# Patient Record
Sex: MALE | Race: WHITE | Employment: FULL TIME | URBAN - METROPOLITAN AREA
[De-identification: names, ages, dates, MRNs, and addresses within clinical notes are randomized per-mention and may not be internally consistent; named-entity substitution may affect disease eponyms.]

---

## 2017-11-27 PROBLEM — I10 ESSENTIAL HYPERTENSION: Status: ACTIVE | Noted: 2017-11-27

## 2018-06-06 ENCOUNTER — APPOINTMENT (OUTPATIENT)
Dept: PHYSICAL THERAPY | Age: 38
End: 2018-06-06

## 2019-08-05 ENCOUNTER — HOSPITAL ENCOUNTER (OUTPATIENT)
Dept: SLEEP MEDICINE | Age: 39
Discharge: HOME | End: 2019-08-05
Attending: FAMILY MEDICINE
Payer: COMMERCIAL

## 2019-08-05 PROCEDURE — 95811 POLYSOM 6/>YRS CPAP 4/> PARM: CPT

## 2021-09-28 ENCOUNTER — HOSPITAL ENCOUNTER (INPATIENT)
Age: 41
LOS: 7 days | Discharge: HOME HEALTH | DRG: 177 | End: 2021-10-05
Attending: EMERGENCY MEDICINE | Admitting: FAMILY MEDICINE
Payer: COMMERCIAL

## 2021-09-28 ENCOUNTER — APPOINTMENT (OUTPATIENT)
Dept: GENERAL RADIOLOGY | Age: 41
DRG: 177 | End: 2021-09-28
Attending: EMERGENCY MEDICINE
Payer: COMMERCIAL

## 2021-09-28 DIAGNOSIS — J96.01 ACUTE RESPIRATORY FAILURE WITH HYPOXIA (HCC): ICD-10-CM

## 2021-09-28 DIAGNOSIS — U07.1 COVID-19 VIRUS INFECTION: Primary | ICD-10-CM

## 2021-09-28 PROBLEM — G47.33 OSA (OBSTRUCTIVE SLEEP APNEA): Status: ACTIVE | Noted: 2021-09-28

## 2021-09-28 PROBLEM — J96.00 ACUTE RESPIRATORY FAILURE (HCC): Status: ACTIVE | Noted: 2021-09-28

## 2021-09-28 LAB
25(OH)D3 SERPL-MCNC: 50.4 NG/ML (ref 30–100)
ALBUMIN SERPL-MCNC: 2.4 G/DL (ref 3.5–5)
ALBUMIN/GLOB SERPL: 0.5 {RATIO} (ref 1.2–3.5)
ALP SERPL-CCNC: 57 U/L (ref 50–136)
ALT SERPL-CCNC: 85 U/L (ref 12–65)
ANION GAP SERPL CALC-SCNC: 8 MMOL/L (ref 7–16)
AST SERPL-CCNC: 68 U/L (ref 15–37)
ATRIAL RATE: 70 BPM
BASOPHILS # BLD: 0.1 K/UL (ref 0–0.2)
BASOPHILS NFR BLD: 1 % (ref 0–2)
BILIRUB SERPL-MCNC: 1 MG/DL (ref 0.2–1.1)
BNP SERPL-MCNC: 197 PG/ML (ref 5–125)
BUN SERPL-MCNC: 17 MG/DL (ref 6–23)
CALCIUM SERPL-MCNC: 8.7 MG/DL (ref 8.3–10.4)
CALCULATED P AXIS, ECG09: 44 DEGREES
CALCULATED R AXIS, ECG10: -13 DEGREES
CHLORIDE SERPL-SCNC: 105 MMOL/L (ref 98–107)
CO2 SERPL-SCNC: 22 MMOL/L (ref 21–32)
COVID-19 RAPID TEST, COVR: DETECTED
CREAT SERPL-MCNC: 0.74 MG/DL (ref 0.8–1.5)
CRP SERPL-MCNC: 10.7 MG/DL (ref 0–0.9)
DIAGNOSIS, 93000: NORMAL
DIFFERENTIAL METHOD BLD: ABNORMAL
EOSINOPHIL # BLD: 0.1 K/UL (ref 0–0.8)
EOSINOPHIL NFR BLD: 1 % (ref 0.5–7.8)
ERYTHROCYTE [DISTWIDTH] IN BLOOD BY AUTOMATED COUNT: 11.9 % (ref 11.9–14.6)
GLOBULIN SER CALC-MCNC: 4.8 G/DL (ref 2.3–3.5)
GLUCOSE SERPL-MCNC: 111 MG/DL (ref 65–100)
HCT VFR BLD AUTO: 39.1 % (ref 41.1–50.3)
HGB BLD-MCNC: 13.8 G/DL (ref 13.6–17.2)
IMM GRANULOCYTES # BLD AUTO: 0.2 K/UL (ref 0–0.5)
IMM GRANULOCYTES NFR BLD AUTO: 4 % (ref 0–5)
LYMPHOCYTES # BLD: 0.6 K/UL (ref 0.5–4.6)
LYMPHOCYTES NFR BLD: 10 % (ref 13–44)
MAGNESIUM SERPL-MCNC: 1.9 MG/DL (ref 1.8–2.4)
MCH RBC QN AUTO: 31.9 PG (ref 26.1–32.9)
MCHC RBC AUTO-ENTMCNC: 35.3 G/DL (ref 31.4–35)
MCV RBC AUTO: 90.5 FL (ref 79.6–97.8)
MONOCYTES # BLD: 0.3 K/UL (ref 0.1–1.3)
MONOCYTES NFR BLD: 5 % (ref 4–12)
NEUTS SEG # BLD: 4.8 K/UL (ref 1.7–8.2)
NEUTS SEG NFR BLD: 79 % (ref 43–78)
NRBC # BLD: 0 K/UL (ref 0–0.2)
P-R INTERVAL, ECG05: 176 MS
PLATELET # BLD AUTO: 188 K/UL (ref 150–450)
PMV BLD AUTO: 10.5 FL (ref 9.4–12.3)
POTASSIUM SERPL-SCNC: 4.5 MMOL/L (ref 3.5–5.1)
PROCALCITONIN SERPL-MCNC: 0.09 NG/ML
PROT SERPL-MCNC: 7.2 G/DL (ref 6.3–8.2)
Q-T INTERVAL, ECG07: 400 MS
QRS DURATION, ECG06: 92 MS
QTC CALCULATION (BEZET), ECG08: 432 MS
RBC # BLD AUTO: 4.32 M/UL (ref 4.23–5.6)
SODIUM SERPL-SCNC: 135 MMOL/L (ref 136–145)
SOURCE, COVRS: ABNORMAL
VENTRICULAR RATE, ECG03: 70 BPM
WBC # BLD AUTO: 6.1 K/UL (ref 4.3–11.1)

## 2021-09-28 PROCEDURE — 71045 X-RAY EXAM CHEST 1 VIEW: CPT

## 2021-09-28 PROCEDURE — 82306 VITAMIN D 25 HYDROXY: CPT

## 2021-09-28 PROCEDURE — 93005 ELECTROCARDIOGRAM TRACING: CPT | Performed by: EMERGENCY MEDICINE

## 2021-09-28 PROCEDURE — 65270000029 HC RM PRIVATE

## 2021-09-28 PROCEDURE — 74011250636 HC RX REV CODE- 250/636: Performed by: EMERGENCY MEDICINE

## 2021-09-28 PROCEDURE — 87635 SARS-COV-2 COVID-19 AMP PRB: CPT

## 2021-09-28 PROCEDURE — 36415 COLL VENOUS BLD VENIPUNCTURE: CPT

## 2021-09-28 PROCEDURE — 74011250637 HC RX REV CODE- 250/637: Performed by: EMERGENCY MEDICINE

## 2021-09-28 PROCEDURE — 84145 PROCALCITONIN (PCT): CPT

## 2021-09-28 PROCEDURE — 94640 AIRWAY INHALATION TREATMENT: CPT

## 2021-09-28 PROCEDURE — 86140 C-REACTIVE PROTEIN: CPT

## 2021-09-28 PROCEDURE — 85025 COMPLETE CBC W/AUTO DIFF WBC: CPT

## 2021-09-28 PROCEDURE — 94760 N-INVAS EAR/PLS OXIMETRY 1: CPT

## 2021-09-28 PROCEDURE — 74011250636 HC RX REV CODE- 250/636: Performed by: FAMILY MEDICINE

## 2021-09-28 PROCEDURE — 3E0333Z INTRODUCTION OF ANTI-INFLAMMATORY INTO PERIPHERAL VEIN, PERCUTANEOUS APPROACH: ICD-10-PCS | Performed by: EMERGENCY MEDICINE

## 2021-09-28 PROCEDURE — 74011250637 HC RX REV CODE- 250/637: Performed by: FAMILY MEDICINE

## 2021-09-28 PROCEDURE — 83735 ASSAY OF MAGNESIUM: CPT

## 2021-09-28 PROCEDURE — 99285 EMERGENCY DEPT VISIT HI MDM: CPT

## 2021-09-28 PROCEDURE — 80053 COMPREHEN METABOLIC PANEL: CPT

## 2021-09-28 PROCEDURE — 83880 ASSAY OF NATRIURETIC PEPTIDE: CPT

## 2021-09-28 RX ORDER — DIPHENHYDRAMINE HCL 25 MG
50 CAPSULE ORAL
Status: DISCONTINUED | OUTPATIENT
Start: 2021-09-28 | End: 2021-10-05 | Stop reason: HOSPADM

## 2021-09-28 RX ORDER — ONDANSETRON 4 MG/1
4 TABLET, ORALLY DISINTEGRATING ORAL
Status: DISCONTINUED | OUTPATIENT
Start: 2021-09-28 | End: 2021-10-05 | Stop reason: HOSPADM

## 2021-09-28 RX ORDER — HYDROCODONE BITARTRATE AND HOMATROPINE METHYLBROMIDE 1.5; 5 MG/5ML; MG/5ML
5 SYRUP ORAL
Status: DISCONTINUED | OUTPATIENT
Start: 2021-09-28 | End: 2021-10-05 | Stop reason: HOSPADM

## 2021-09-28 RX ORDER — DEXAMETHASONE SODIUM PHOSPHATE 100 MG/10ML
6 INJECTION INTRAMUSCULAR; INTRAVENOUS EVERY 24 HOURS
Status: DISCONTINUED | OUTPATIENT
Start: 2021-09-29 | End: 2021-09-30

## 2021-09-28 RX ORDER — HYDROCORTISONE 25 MG/G
CREAM TOPICAL
Status: DISCONTINUED | OUTPATIENT
Start: 2021-09-28 | End: 2021-10-05 | Stop reason: HOSPADM

## 2021-09-28 RX ORDER — ONDANSETRON 2 MG/ML
4 INJECTION INTRAMUSCULAR; INTRAVENOUS
Status: DISCONTINUED | OUTPATIENT
Start: 2021-09-28 | End: 2021-10-05 | Stop reason: HOSPADM

## 2021-09-28 RX ORDER — UREA 10 %
1 LOTION (ML) TOPICAL DAILY
Status: DISCONTINUED | OUTPATIENT
Start: 2021-09-29 | End: 2021-09-28 | Stop reason: SDUPTHER

## 2021-09-28 RX ORDER — LOPERAMIDE HYDROCHLORIDE 2 MG/1
2 CAPSULE ORAL
Status: DISCONTINUED | OUTPATIENT
Start: 2021-09-28 | End: 2021-10-05 | Stop reason: HOSPADM

## 2021-09-28 RX ORDER — ALBUTEROL SULFATE 90 UG/1
2 AEROSOL, METERED RESPIRATORY (INHALATION)
Status: DISCONTINUED | OUTPATIENT
Start: 2021-09-28 | End: 2021-10-05 | Stop reason: HOSPADM

## 2021-09-28 RX ORDER — ACETAMINOPHEN 325 MG/1
650 TABLET ORAL
Status: DISCONTINUED | OUTPATIENT
Start: 2021-09-28 | End: 2021-10-05 | Stop reason: HOSPADM

## 2021-09-28 RX ORDER — SODIUM CHLORIDE 0.9 % (FLUSH) 0.9 %
5-40 SYRINGE (ML) INJECTION EVERY 8 HOURS
Status: DISCONTINUED | OUTPATIENT
Start: 2021-09-28 | End: 2021-10-05 | Stop reason: HOSPADM

## 2021-09-28 RX ORDER — ZINC SULFATE 50(220)MG
1 CAPSULE ORAL DAILY
Status: DISCONTINUED | OUTPATIENT
Start: 2021-09-29 | End: 2021-10-05 | Stop reason: HOSPADM

## 2021-09-28 RX ORDER — ENOXAPARIN SODIUM 100 MG/ML
40 INJECTION SUBCUTANEOUS EVERY 24 HOURS
Status: DISCONTINUED | OUTPATIENT
Start: 2021-09-28 | End: 2021-10-01

## 2021-09-28 RX ORDER — DEXAMETHASONE SODIUM PHOSPHATE 100 MG/10ML
6 INJECTION INTRAMUSCULAR; INTRAVENOUS
Status: COMPLETED | OUTPATIENT
Start: 2021-09-28 | End: 2021-09-28

## 2021-09-28 RX ORDER — ASCORBIC ACID 500 MG
500 TABLET ORAL 2 TIMES DAILY
Status: DISCONTINUED | OUTPATIENT
Start: 2021-09-28 | End: 2021-10-05 | Stop reason: HOSPADM

## 2021-09-28 RX ORDER — HYDRALAZINE HYDROCHLORIDE 20 MG/ML
20 INJECTION INTRAMUSCULAR; INTRAVENOUS
Status: DISCONTINUED | OUTPATIENT
Start: 2021-09-28 | End: 2021-10-05 | Stop reason: HOSPADM

## 2021-09-28 RX ORDER — ACETAMINOPHEN 650 MG/1
650 SUPPOSITORY RECTAL
Status: DISCONTINUED | OUTPATIENT
Start: 2021-09-28 | End: 2021-10-05 | Stop reason: HOSPADM

## 2021-09-28 RX ORDER — SODIUM CHLORIDE 0.9 % (FLUSH) 0.9 %
5-40 SYRINGE (ML) INJECTION AS NEEDED
Status: DISCONTINUED | OUTPATIENT
Start: 2021-09-28 | End: 2021-10-05 | Stop reason: HOSPADM

## 2021-09-28 RX ORDER — POLYETHYLENE GLYCOL 3350 17 G/17G
17 POWDER, FOR SOLUTION ORAL DAILY PRN
Status: DISCONTINUED | OUTPATIENT
Start: 2021-09-28 | End: 2021-10-05 | Stop reason: HOSPADM

## 2021-09-28 RX ADMIN — DIPHENHYDRAMINE HYDROCHLORIDE 50 MG: 25 CAPSULE ORAL at 21:17

## 2021-09-28 RX ADMIN — OXYCODONE HYDROCHLORIDE AND ACETAMINOPHEN 500 MG: 500 TABLET ORAL at 17:09

## 2021-09-28 RX ADMIN — ENOXAPARIN SODIUM 40 MG: 100 INJECTION SUBCUTANEOUS at 17:08

## 2021-09-28 RX ADMIN — ALBUTEROL SULFATE 2 PUFF: 90 AEROSOL, METERED RESPIRATORY (INHALATION) at 22:10

## 2021-09-28 RX ADMIN — Medication 10 ML: at 21:15

## 2021-09-28 RX ADMIN — DEXAMETHASONE SODIUM PHOSPHATE 6 MG: 10 INJECTION INTRAMUSCULAR; INTRAVENOUS at 13:27

## 2021-09-28 NOTE — ED TRIAGE NOTES
Pt arrives via [employer] from home with CC SHOB x 2 weeks, CV-19 + 2 weeks ago.  CV-19 negative 25 Sept. Patient states he received \"steroid\" shot 22 Sept 21.     76% RA upon EMS arrival, 6 L NC 95% O2    125mg Solumedrol     18 LFA     /90 HR 80 Sat 95%  T 97.4 oral

## 2021-09-28 NOTE — PROGRESS NOTES
TRANSFER - IN REPORT:    Verbal report received from Frantz(name) on Doni Agudelo  being received from ED(unit) for routine progression of care      Report consisted of patients Situation, Background, Assessment and   Recommendations(SBAR). Information from the following report(s) SBAR was reviewed with the receiving nurse. Opportunity for questions and clarification was provided. Assessment to be completed upon patients arrival to unit and care to be assumed.

## 2021-09-28 NOTE — ED NOTES
TRANSFER - OUT REPORT:    Verbal report given to Gisselle(name) on Doni Gastelum  being transferred to [facility](unit) for routine progression of care       Report consisted of patients Situation, Background, Assessment and   Recommendations(SBAR). Information from the following report(s) SBAR, ED Summary, [facility] and Recent Results was reviewed with the receiving nurse. Lines:   Peripheral IV 09/28/21 Anterior; Left Forearm (Active)        Opportunity for questions and clarification was provided.       Patient transported with:   O2 @ 6 liters  Tech

## 2021-09-28 NOTE — PROGRESS NOTES
Patient received to room 837 via transport and stretcher  Admission assessment complete  Alert and oriented x4  Respirations even and unlabored on 6L NC  No signs of distress  Call light and belongings within reach  Safety measures in place

## 2021-09-28 NOTE — ED PROVIDER NOTES
Patient presents the ER with complaints of worsening shortness of breath. Apparently tested positive for Covid on 15 September. Reports slow steady worsening of symptoms. States that shortness of breath has worsened. Reports cough which prompted of clear reddish sputum. Denies any continuous fevers. Denies any vomiting. EMS reported room air sats were 70%. Placed on 4 L nasal cannula. Given Solu-Medrol in route. The history is provided by the patient. Shortness of Breath  This is a new problem. The problem occurs frequently. The current episode started more than 1 week ago. Associated symptoms include cough and sputum production. Pertinent negatives include no fever, no sore throat, no wheezing, no chest pain, no abdominal pain, no leg pain and no leg swelling. He has tried nothing for the symptoms. He has had no prior hospitalizations. Associated medical issues do not include chronic lung disease, PE or heart failure. Past Medical History:   Diagnosis Date    Hypertension        Past Surgical History:   Procedure Laterality Date    HX ORTHOPAEDIC      right bicep          Family History:   Problem Relation Age of Onset    Heart Disease Father         stent placement    Hypertension Father        Social History     Socioeconomic History    Marital status:      Spouse name: Not on file    Number of children: Not on file    Years of education: Not on file    Highest education level: Not on file   Occupational History    Not on file   Tobacco Use    Smoking status: Former Smoker     Types: Cigarettes    Smokeless tobacco: Never Used    Tobacco comment: quit 7/2017   Substance and Sexual Activity    Alcohol use:  Yes     Alcohol/week: 24.0 standard drinks     Types: 24 Cans of beer per week    Drug use: No    Sexual activity: Yes     Partners: Female   Other Topics Concern    Not on file   Social History Narrative    Not on file     Social Determinants of Health     Financial Resource Strain:     Difficulty of Paying Living Expenses:    Food Insecurity:     Worried About Running Out of Food in the Last Year:     [address] in the Last Year:    Transportation Needs:     Lack of Transportation (Medical):  Lack of Transportation (Non-Medical):    Physical Activity:     Days of Exercise per Week:     Minutes of Exercise per Session:    Stress:     Feeling of Stress :    Social Connections:     Frequency of Communication with Friends and Family:     Frequency of Social Gatherings with Friends and Family:     Attends [employer] Services:     Active Member of Clubs or Organizations:     Attends Club or Organization Meetings:     Marital Status:    Intimate Partner Violence:     Fear of Current or Ex-Partner:     Emotionally Abused:     Physically Abused:     Sexually Abused: ALLERGIES: Shellfish derived    Review of Systems   Constitutional: Negative for diaphoresis and fever. HENT: Negative for congestion, dental problem, sore throat and voice change. Eyes: Negative for photophobia and redness. Respiratory: Positive for cough, sputum production, chest tightness and shortness of breath. Negative for wheezing. Cardiovascular: Negative for chest pain and leg swelling. Gastrointestinal: Negative for abdominal pain, anal bleeding, diarrhea and nausea. Endocrine: Negative for polyphagia and polyuria. Genitourinary: Negative for decreased urine volume, discharge, flank pain, hematuria and urgency. Musculoskeletal: Negative for back pain and gait problem. Skin: Negative for color change and pallor. Neurological: Negative for tremors, syncope, light-headedness and numbness. Hematological: Does not bruise/bleed easily. Psychiatric/Behavioral: Negative for behavioral problems and decreased concentration. All other systems reviewed and are negative.       Vitals:    09/28/21 1204   BP: (!) 166/96   Pulse: 79   Resp: 22   Temp: 98 °F (36.7 °C) SpO2: 95%   Weight: 127 kg (280 lb)   Height: 6' 3\" (1.905 m)            Physical Exam  Vitals and nursing note reviewed. Constitutional:       General: He is not in acute distress. Appearance: Normal appearance. He is not ill-appearing. HENT:      Head: Normocephalic and atraumatic. Right Ear: External ear normal.      Left Ear: External ear normal.      Nose: Nose normal. No congestion or rhinorrhea. Eyes:      Extraocular Movements: Extraocular movements intact. Pupils: Pupils are equal, round, and reactive to light. Cardiovascular:      Rate and Rhythm: Normal rate and regular rhythm. Pulses: Normal pulses. Heart sounds: Normal heart sounds. No murmur heard. Pulmonary:      Breath sounds: Rhonchi and rales present. Abdominal:      General: Abdomen is flat. There is no distension. Palpations: Abdomen is soft. There is no mass. Tenderness: There is no abdominal tenderness. Hernia: No hernia is present. Musculoskeletal:         General: No swelling, tenderness, deformity or signs of injury. Cervical back: Normal range of motion and neck supple. No rigidity or tenderness. Skin:     Capillary Refill: Capillary refill takes less than 2 seconds. Coloration: Skin is not jaundiced or pale. Findings: No bruising or erythema. Neurological:      General: No focal deficit present. Mental Status: He is alert and oriented to person, place, and time. Cranial Nerves: No cranial nerve deficit. Motor: No weakness. Psychiatric:         Mood and Affect: Mood normal.         Behavior: Behavior normal.          MDM  Number of Diagnoses or Management Options  Diagnosis management comments: Concern for worsening Covid infection. Plan for screening labs EKG and chest x-ray. 12:59 PM  Labs fairly stable here. Chest x-ray is consistent with bilateral Covid pneumonia. Patient on 4 L nasal cannula.   Plan discussed case with hospitalist. Amount and/or Complexity of Data Reviewed  Clinical lab tests: ordered and reviewed  Tests in the radiology section of CPT®: ordered and reviewed  Decide to obtain previous medical records or to obtain history from someone other than the patient: yes (09/15/2021 SARS CoV 2 RNA (COVID-19), QL, rRT-PCR, Respiratory Specimen       SARS-CO-V2 positive     Main Office: Luke Alicea [facility], [address] [place] [address] )  Review and summarize past medical records: yes  Discuss the patient with other providers: yes  Independent visualization of images, tracings, or specimens: yes (EKG interpretation: Normal sinus rhythm, rate of 70, normal axis, no blocks, no ST segment elevation, no previous EKG for comparison)    Risk of Complications, Morbidity, and/or Mortality  Presenting problems: high  Diagnostic procedures: moderate  Management options: high  General comments: Critical Care Time 60 Minutes: Excluding all billable procedures, time spent at the bedside directing patients resuscitation, updating family, and coordinating care with consultants             Procedures               Results Include:    Recent Results (from the past 24 hour(s))   EKG, 12 LEAD, INITIAL    Collection Time: 09/28/21 12:12 PM   Result Value Ref Range    Ventricular Rate 70 BPM    Atrial Rate 70 BPM    P-R Interval 176 ms    QRS Duration 92 ms    Q-T Interval 400 ms    QTC Calculation (Bezet) 432 ms    Calculated P Axis 44 degrees    Calculated R Axis -13 degrees    Diagnosis       !! AGE AND GENDER SPECIFIC ECG ANALYSIS !!   Normal sinus rhythm  Nonspecific T wave abnormality  Abnormal ECG  No previous ECGs available     CBC WITH AUTOMATED DIFF    Collection Time: 09/28/21 12:16 PM   Result Value Ref Range    WBC 6.1 4.3 - 11.1 K/uL    RBC 4.32 4.23 - 5.6 M/uL    HGB 13.8 13.6 - 17.2 g/dL    HCT 39.1 (L) 41.1 - 50.3 %    MCV 90.5 79.6 - 97.8 FL    MCH 31.9 26.1 - 32.9 PG    MCHC 35.3 (H) 31.4 - 35.0 g/dL    RDW 11.9 11.9 - 14.6 %    PLATELET 223 264 - 450 K/uL    MPV 10.5 9.4 - 12.3 FL    ABSOLUTE NRBC 0.00 0.0 - 0.2 K/uL    DF PENDING    METABOLIC PANEL, COMPREHENSIVE    Collection Time: 09/28/21 12:16 PM   Result Value Ref Range    Sodium 135 (L) 136 - 145 mmol/L    Potassium 4.5 3.5 - 5.1 mmol/L    Chloride 105 98 - 107 mmol/L    CO2 22 21 - 32 mmol/L    Anion gap 8 7 - 16 mmol/L    Glucose 111 (H) 65 - 100 mg/dL    BUN 17 6 - 23 MG/DL    Creatinine 0.74 (L) 0.8 - 1.5 MG/DL    GFR est AA >60 >60 ml/min/1.73m2    GFR est non-AA >60 >60 ml/min/1.73m2    Calcium 8.7 8.3 - 10.4 MG/DL    Bilirubin, total 1.0 0.2 - 1.1 MG/DL    ALT (SGPT) 85 (H) 12 - 65 U/L    AST (SGOT) 68 (H) 15 - 37 U/L    Alk. phosphatase 57 50 - 136 U/L    Protein, total 7.2 6.3 - 8.2 g/dL    Albumin 2.4 (L) 3.5 - 5.0 g/dL    Globulin 4.8 (H) 2.3 - 3.5 g/dL    A-G Ratio 0.5 (L) 1.2 - 3.5     MAGNESIUM    Collection Time: 09/28/21 12:16 PM   Result Value Ref Range    Magnesium 1.9 1.8 - 2.4 mg/dL   C REACTIVE PROTEIN, QT    Collection Time: 09/28/21 12:16 PM   Result Value Ref Range    C-Reactive protein 10.7 (H) 0.0 - 0.9 mg/dL   NT-PRO BNP    Collection Time: 09/28/21 12:16 PM   Result Value Ref Range    NT pro- (H) 5 - 125 PG/ML     Voice dictation software was used during the making of this note. This software is not perfect and grammatical and other typographical errors may be present. This note has been proofread, but may still contain errors. Hadley Schultz MD; 9/28/2021 @1:00 PM   ===================================================================    I wore appropriate PPE throughout this patient's ED visit.  Hadley Schultz MD, 1:00 PM

## 2021-09-28 NOTE — H&P
Hospitalist Admission History and Physical     NAME:  Dheeraj Mcdaniel   Age:  39 y.o.  :   1980   MRN:   [id number]  PCP: Alexey Mathew MD  Consulting MD:  Treatment Team: Attending Provider: Jose G Wynn DO; Primary Nurse: Rennie Rinne, RN    Chief Complaint   Patient presents with    Shortness of Breath         HPI:   Patient is a 39 y.o. male who presented to the ED for cc worsening SOB after being diagnosed with COVID . Hx of HTN but not on any medications. Vitals -70% while on room air. Now on 4L NC    Labs- CRP 10.7. LFTs slightly elevated. Chest x ray with Multifocal peripheral predominant patchy airspace opacities of the lungs suspicious for atypical pneumonia  Past Medical History:   Diagnosis Date    Hypertension         Past Surgical History:   Procedure Laterality Date    HX ORTHOPAEDIC      right bicep         Family History   Problem Relation Age of Onset    Heart Disease Father         stent placement    Hypertension Father        Social History     Social History Narrative    Not on file        Social History     Tobacco Use    Smoking status: Former Smoker     Types: Cigarettes    Smokeless tobacco: Never Used    Tobacco comment: quit 2017   Substance Use Topics    Alcohol use:  Yes     Alcohol/week: 24.0 standard drinks     Types: 24 Cans of beer per week        Social History     Substance and Sexual Activity   Drug Use No         Allergies   Allergen Reactions    Shellfish Derived Nausea and Vomiting       Prior to Admission medications    Not on File           Review of Systems    Constitutional: NAD  Eyes:  no change in visual acuity, no photophobia  Ears, nose, mouth, throat, and face: no  Odynphagia, dysphagia, no thrush or exudate, negative for chronic sinus congestion, recurrent headaches  Respiratory: SOB especially during exertion  Cardiovascular: negative for CP, palpitations, or PND  Gastrointestinal: poor PO intake, mild diarrhea  Genitourinary: no urgency, frequency, or dysuria, no nocturia  Integument/breast: negative for skin rash or skin lesions  Hematologic/lymphatic: negative for known bleeding disorder  Musculoskeletal:negative for joint pain or joint tenderness  Neurological: generalized weakness, dizziness when ambulating  Behavioral/Psych: negative for depression or chronic anxiety,   Endocrine: negative for polydyspia, polyuria or intolerance to heat or cold  Allergic/Immunologic: negative for chronic allergic rhinitis, or known connective tissue disorder      Objective:     Patient Vitals for the past 24 hrs:   Temp Pulse Resp BP SpO2   09/28/21 1208     95 %   09/28/21 1204 98 °F (36.7 °C) 79 22 (!) 166/96 95 %        No intake/output data recorded. No intake/output data recorded. Data Review:   Recent Results (from the past 24 hour(s))   EKG, 12 LEAD, INITIAL    Collection Time: 09/28/21 12:12 PM   Result Value Ref Range    Ventricular Rate 70 BPM    Atrial Rate 70 BPM    P-R Interval 176 ms    QRS Duration 92 ms    Q-T Interval 400 ms    QTC Calculation (Bezet) 432 ms    Calculated P Axis 44 degrees    Calculated R Axis -13 degrees    Diagnosis       !! AGE AND GENDER SPECIFIC ECG ANALYSIS !! Normal sinus rhythm  Nonspecific T wave abnormality  Abnormal ECG  No previous ECGs available     CBC WITH AUTOMATED DIFF    Collection Time: 09/28/21 12:16 PM   Result Value Ref Range    WBC 6.1 4.3 - 11.1 K/uL    RBC 4.32 4.23 - 5.6 M/uL    HGB 13.8 13.6 - 17.2 g/dL    HCT 39.1 (L) 41.1 - 50.3 %    MCV 90.5 79.6 - 97.8 FL    MCH 31.9 26.1 - 32.9 PG    MCHC 35.3 (H) 31.4 - 35.0 g/dL    RDW 11.9 11.9 - 14.6 %    PLATELET 976 840 - 345 K/uL    MPV 10.5 9.4 - 12.3 FL    ABSOLUTE NRBC 0.00 0.0 - 0.2 K/uL    NEUTROPHILS 79 (H) 43 - 78 %    LYMPHOCYTES 10 (L) 13 - 44 %    MONOCYTES 5 4.0 - 12.0 %    EOSINOPHILS 1 0.5 - 7.8 %    BASOPHILS 1 0.0 - 2.0 %    IMMATURE GRANULOCYTES 4 0.0 - 5.0 %    ABS.  NEUTROPHILS 4.8 1.7 - 8.2 K/UL    ABS. LYMPHOCYTES 0.6 0.5 - 4.6 K/UL    ABS. MONOCYTES 0.3 0.1 - 1.3 K/UL    ABS. EOSINOPHILS 0.1 0.0 - 0.8 K/UL    ABS. BASOPHILS 0.1 0.0 - 0.2 K/UL    ABS. IMM. GRANS. 0.2 0.0 - 0.5 K/UL    DF AUTOMATED     METABOLIC PANEL, COMPREHENSIVE    Collection Time: 09/28/21 12:16 PM   Result Value Ref Range    Sodium 135 (L) 136 - 145 mmol/L    Potassium 4.5 3.5 - 5.1 mmol/L    Chloride 105 98 - 107 mmol/L    CO2 22 21 - 32 mmol/L    Anion gap 8 7 - 16 mmol/L    Glucose 111 (H) 65 - 100 mg/dL    BUN 17 6 - 23 MG/DL    Creatinine 0.74 (L) 0.8 - 1.5 MG/DL    GFR est AA >60 >60 ml/min/1.73m2    GFR est non-AA >60 >60 ml/min/1.73m2    Calcium 8.7 8.3 - 10.4 MG/DL    Bilirubin, total 1.0 0.2 - 1.1 MG/DL    ALT (SGPT) 85 (H) 12 - 65 U/L    AST (SGOT) 68 (H) 15 - 37 U/L    Alk. phosphatase 57 50 - 136 U/L    Protein, total 7.2 6.3 - 8.2 g/dL    Albumin 2.4 (L) 3.5 - 5.0 g/dL    Globulin 4.8 (H) 2.3 - 3.5 g/dL    A-G Ratio 0.5 (L) 1.2 - 3.5     MAGNESIUM    Collection Time: 09/28/21 12:16 PM   Result Value Ref Range    Magnesium 1.9 1.8 - 2.4 mg/dL   C REACTIVE PROTEIN, QT    Collection Time: 09/28/21 12:16 PM   Result Value Ref Range    C-Reactive protein 10.7 (H) 0.0 - 0.9 mg/dL   NT-PRO BNP    Collection Time: 09/28/21 12:16 PM   Result Value Ref Range    NT pro- (H) 5 - 125 PG/ML       Physical Exam:     General:  Alert, cooperative, no distress, appears stated age. Eyes:  Conjunctivae/corneas clear. Ears:  Normal TMs and external ear canals both ears. Nose: Nares normal.    Mouth/Throat: Wearing mask   Neck:  no JVD. Back:   deferred   Lungs:   Minimal breathe sounds throughout, mild increased work of breathing noted. Heart:  Regular rate and rhythm, S1, S2 normal   Abdomen:   Soft, non-tender. Bowel sounds normal. Obese   Extremities: Extremities normal, atraumatic, no cyanosis or edema. Pulses: 2+ and symmetric all extremities.    Skin: Skin color, texture, turgor normal. No rashes or lesions Lymph nodes: Cervical, supraclavicular, and axillary nodes normal.   Neurologic: CNII-XII intact. Assessment and Plan     Principal Problem:    COVID-19 (9/28/2021)    Active Problems:    Essential hypertension (11/27/2017)      Acute respiratory failure (HCC) (9/28/2021)      NICOLE (obstructive sleep apnea) (9/28/2021)    COVID with acute respiratory failure  Decadron 6mg daily  Vitamin C, Zinc, check vitamin D level  Albuterol q6hr  CRP >7.5 but only on 5L NC. Out of time frame for remdesivir since symptoms been going on for the past two weeks. If oxygen supplement worsens, can consider Barcitinib  Recheck COVID since I cannot find + result in our system    HTN  PRN hydralazine. Does not take any BP medications at home    Diarrhea  PRN immodium    NICOLE  States he is supposed to be on CPAP. Will order    Elevated LFTS  Likely reactive from [employer].  Trend    DVT prophylaxis - Lovenox    Signed By: Elmo Canela DO   September 28, 2021

## 2021-09-29 LAB
ALBUMIN SERPL-MCNC: 2.5 G/DL (ref 3.5–5)
ALBUMIN/GLOB SERPL: 0.5 {RATIO} (ref 1.2–3.5)
ALP SERPL-CCNC: 60 U/L (ref 50–136)
ALT SERPL-CCNC: 105 U/L (ref 12–65)
ANION GAP SERPL CALC-SCNC: 11 MMOL/L (ref 7–16)
AST SERPL-CCNC: 43 U/L (ref 15–37)
BASOPHILS # BLD: 0 K/UL (ref 0–0.2)
BASOPHILS NFR BLD: 0 % (ref 0–2)
BILIRUB SERPL-MCNC: 0.6 MG/DL (ref 0.2–1.1)
BUN SERPL-MCNC: 18 MG/DL (ref 6–23)
CALCIUM SERPL-MCNC: 9 MG/DL (ref 8.3–10.4)
CHLORIDE SERPL-SCNC: 105 MMOL/L (ref 98–107)
CO2 SERPL-SCNC: 23 MMOL/L (ref 21–32)
CREAT SERPL-MCNC: 0.86 MG/DL (ref 0.8–1.5)
DIFFERENTIAL METHOD BLD: ABNORMAL
EOSINOPHIL # BLD: 0 K/UL (ref 0–0.8)
EOSINOPHIL NFR BLD: 0 % (ref 0.5–7.8)
ERYTHROCYTE [DISTWIDTH] IN BLOOD BY AUTOMATED COUNT: 11.8 % (ref 11.9–14.6)
GLOBULIN SER CALC-MCNC: 4.7 G/DL (ref 2.3–3.5)
GLUCOSE SERPL-MCNC: 139 MG/DL (ref 65–100)
HCT VFR BLD AUTO: 38.9 % (ref 41.1–50.3)
HGB BLD-MCNC: 13.6 G/DL (ref 13.6–17.2)
IMM GRANULOCYTES # BLD AUTO: 0.4 K/UL (ref 0–0.5)
IMM GRANULOCYTES NFR BLD AUTO: 4 % (ref 0–5)
LYMPHOCYTES # BLD: 0.8 K/UL (ref 0.5–4.6)
LYMPHOCYTES NFR BLD: 9 % (ref 13–44)
MCH RBC QN AUTO: 31.3 PG (ref 26.1–32.9)
MCHC RBC AUTO-ENTMCNC: 35 G/DL (ref 31.4–35)
MCV RBC AUTO: 89.6 FL (ref 79.6–97.8)
MONOCYTES # BLD: 0.4 K/UL (ref 0.1–1.3)
MONOCYTES NFR BLD: 4 % (ref 4–12)
NEUTS SEG # BLD: 7.3 K/UL (ref 1.7–8.2)
NEUTS SEG NFR BLD: 83 % (ref 43–78)
NRBC # BLD: 0 K/UL (ref 0–0.2)
PLATELET # BLD AUTO: 202 K/UL (ref 150–450)
PMV BLD AUTO: 10.6 FL (ref 9.4–12.3)
POTASSIUM SERPL-SCNC: 3.9 MMOL/L (ref 3.5–5.1)
PROT SERPL-MCNC: 7.2 G/DL (ref 6.3–8.2)
RBC # BLD AUTO: 4.34 M/UL (ref 4.23–5.6)
SODIUM SERPL-SCNC: 139 MMOL/L (ref 136–145)
WBC # BLD AUTO: 8.9 K/UL (ref 4.3–11.1)

## 2021-09-29 PROCEDURE — 97165 OT EVAL LOW COMPLEX 30 MIN: CPT

## 2021-09-29 PROCEDURE — 97161 PT EVAL LOW COMPLEX 20 MIN: CPT

## 2021-09-29 PROCEDURE — 74011250637 HC RX REV CODE- 250/637: Performed by: EMERGENCY MEDICINE

## 2021-09-29 PROCEDURE — 80053 COMPREHEN METABOLIC PANEL: CPT

## 2021-09-29 PROCEDURE — 77010033678 HC OXYGEN DAILY

## 2021-09-29 PROCEDURE — 97535 SELF CARE MNGMENT TRAINING: CPT

## 2021-09-29 PROCEDURE — 74011250636 HC RX REV CODE- 250/636: Performed by: FAMILY MEDICINE

## 2021-09-29 PROCEDURE — 36415 COLL VENOUS BLD VENIPUNCTURE: CPT

## 2021-09-29 PROCEDURE — 97530 THERAPEUTIC ACTIVITIES: CPT

## 2021-09-29 PROCEDURE — 94760 N-INVAS EAR/PLS OXIMETRY 1: CPT

## 2021-09-29 PROCEDURE — 94640 AIRWAY INHALATION TREATMENT: CPT

## 2021-09-29 PROCEDURE — 65270000029 HC RM PRIVATE

## 2021-09-29 PROCEDURE — 85025 COMPLETE CBC W/AUTO DIFF WBC: CPT

## 2021-09-29 PROCEDURE — 74011250637 HC RX REV CODE- 250/637: Performed by: NURSE PRACTITIONER

## 2021-09-29 PROCEDURE — 74011250637 HC RX REV CODE- 250/637: Performed by: FAMILY MEDICINE

## 2021-09-29 RX ORDER — FAMOTIDINE 20 MG/1
20 TABLET, FILM COATED ORAL 2 TIMES DAILY
Status: DISCONTINUED | OUTPATIENT
Start: 2021-09-29 | End: 2021-10-05 | Stop reason: HOSPADM

## 2021-09-29 RX ORDER — MAG HYDROX/ALUMINUM HYD/SIMETH 200-200-20
30 SUSPENSION, ORAL (FINAL DOSE FORM) ORAL
Status: DISCONTINUED | OUTPATIENT
Start: 2021-09-29 | End: 2021-10-05 | Stop reason: HOSPADM

## 2021-09-29 RX ADMIN — ALBUTEROL SULFATE 2 PUFF: 90 AEROSOL, METERED RESPIRATORY (INHALATION) at 22:24

## 2021-09-29 RX ADMIN — OXYCODONE HYDROCHLORIDE AND ACETAMINOPHEN 500 MG: 500 TABLET ORAL at 17:22

## 2021-09-29 RX ADMIN — ALBUTEROL SULFATE 2 PUFF: 90 AEROSOL, METERED RESPIRATORY (INHALATION) at 02:50

## 2021-09-29 RX ADMIN — DEXAMETHASONE SODIUM PHOSPHATE 6 MG: 10 INJECTION INTRAMUSCULAR; INTRAVENOUS at 12:24

## 2021-09-29 RX ADMIN — ALBUTEROL SULFATE 2 PUFF: 90 AEROSOL, METERED RESPIRATORY (INHALATION) at 05:13

## 2021-09-29 RX ADMIN — ALUMINUM HYDROXIDE, MAGNESIUM HYDROXIDE, AND SIMETHICONE 30 ML: 200; 200; 20 SUSPENSION ORAL at 20:57

## 2021-09-29 RX ADMIN — DIPHENHYDRAMINE HYDROCHLORIDE 50 MG: 25 CAPSULE ORAL at 20:57

## 2021-09-29 RX ADMIN — Medication 10 ML: at 05:48

## 2021-09-29 RX ADMIN — ALBUTEROL SULFATE 2 PUFF: 90 AEROSOL, METERED RESPIRATORY (INHALATION) at 08:00

## 2021-09-29 RX ADMIN — OXYCODONE HYDROCHLORIDE AND ACETAMINOPHEN 500 MG: 500 TABLET ORAL at 07:49

## 2021-09-29 RX ADMIN — ENOXAPARIN SODIUM 40 MG: 100 INJECTION SUBCUTANEOUS at 17:22

## 2021-09-29 RX ADMIN — Medication 10 ML: at 21:00

## 2021-09-29 RX ADMIN — Medication 1 CAPSULE: at 07:49

## 2021-09-29 RX ADMIN — Medication 10 ML: at 12:25

## 2021-09-29 NOTE — PROGRESS NOTES
Hospitalist Progress Note   Admit Date:  2021 12:00 PM   Name:  Giovanni Layton   Age:  39 y.o. Sex:  male  :  1980   MRN:  [id number]   Room:  [facility]    Presenting Complaint: Shortness of Breath    Reason(s) for Admission: COVID-19 [U07.1]  Acute respiratory failure [facility]) [J96.00]     Hospital Course & Interval History:   Patient is a 39 y.o. male who presented to the ED for cc worsening SOB after being diagnosed with COVID .  Hx of HTN but not on any medications    Subjective (21):  Patient reports some OLIVA and loss of taste and smell    Assessment & Plan:     Principal Problem:  Acute Respiratory Failure with COVID-19 Pneumonia (2021)  21 Continue decadron, COVID vits, Inhaler; If O2 requirements increase, will consider Barcitinib; Procal 0.09; will hold off on abx     Active Problems:  Essential hypertension (2017)  21 BP stable with intervention; Prn antihypertensive; will start antihypertensive if needed     NICOLE (obstructive sleep apnea) (2021)  CPAP ordered qhs     Transaminitis   21 Likely COVID induced; Monitor     Dispo/Discharge Planning:     Dispo pending O2 need    Diet:  ADULT DIET Regular; Low Fat/Low Chol/High Fiber/2 gm Na  DVT PPx: Lovenox SQ   Code status: Full Code    Hospital Problems as of 2021 Date Reviewed: 2018        Codes Class Noted - Resolved POA    Acute respiratory failure ([facility] [address] ICD-10-CM: J96.00  ICD-9-CM: 518.81  2021 - Present Unknown        * (Principal) COVID-19 ICD-10-CM: U07.1  ICD-9-CM: 079.89  2021 - Present Unknown        NICOLE (obstructive sleep apnea) ICD-10-CM: G47.33  ICD-9-CM: 327.23  2021 - Present Unknown        Essential hypertension ICD-10-CM: I10  ICD-9-CM: 401.9  2017 - Present Yes              Objective:     Patient Vitals for the past 24 hrs:   Temp Pulse Resp BP SpO2   21 1137 98.1 °F (36.7 °C) 79 21 137/87 91 %   21 0921     91 %   21 0750 98 °F (36.7 °C) 75 20 119/76 92 %   09/29/21 0317 97.9 °F (36.6 °C) 73 18 (!) 141/86 90 %   09/29/21 0250     91 %   09/28/21 2218 97.8 °F (36.6 °C) 80 22 132/81 90 %   09/28/21 2210     91 %   09/28/21 1907 98 °F (36.7 °C) 93 24 (!) 148/85 90 %   09/28/21 1509 97.4 °F (36.3 °C) 74 24 (!) 153/92 95 %   09/28/21 1402  71 (!) 34 (!) 155/89 95 %   09/28/21 1331  69 27 (!) 155/91 94 %     Oxygen Therapy  O2 Sat (%): 91 % (09/29/21 1137)  Pulse via Oximetry: 88 beats per minute (09/29/21 0921)  O2 Device: Nasal cannula (09/29/21 0921)  O2 Flow Rate (L/min): 7 l/min (09/29/21 0921)    Estimated body mass index is 32.31 kg/m² as calculated from the following:    Height as of this encounter: 6' 3\" (1.905 m). Weight as of this encounter: 117.3 kg (258 lb 8 oz). Intake/Output Summary (Last 24 hours) at 9/29/2021 1305  Last data filed at 9/29/2021 2684  Gross per 24 hour   Intake 240 ml   Output    Net 240 ml         Physical Exam:     General:    Well nourished. No overt distress  Head:  Normocephalic, atraumatic  Eyes:  Sclerae appear normal.  Pupils equally round. ENT:  Nares appear normal, no drainage. Moist oral mucosa  Neck:  No restricted ROM. Trachea midline   CV:   RRR. No m/r/g. No jugular venous distension. Lungs:   CTAB. No wheezing, rhonchi, or rales. Respirations even, unlabored  Abdomen: Bowel sounds present. Soft, nontender, nondistended. Extremities: No cyanosis or clubbing. No edema  Skin:     No rashes and normal coloration. Warm and dry. Neuro:  Cranial nerves II-XII grossly intact. Sensation intact  Psych:  Normal mood and affect.   Alert and oriented x3    I have reviewed ordered lab tests and independently visualized imaging below:    Last 24hr Labs:  Recent Results (from the past 24 hour(s))   COVID-19 RAPID TEST    Collection Time: 09/28/21  1:41 PM   Result Value Ref Range    Specimen source NASAL      COVID-19 rapid test Detected (AA) NOTD     VITAMIN D, 25 HYDROXY    Collection Time: 09/28/21  5:53 PM   Result Value Ref Range    Vitamin D 25-Hydroxy 50.4 30.0 - 752.4 ng/mL   METABOLIC PANEL, COMPREHENSIVE    Collection Time: 09/29/21  6:52 AM   Result Value Ref Range    Sodium 139 136 - 145 mmol/L    Potassium 3.9 3.5 - 5.1 mmol/L    Chloride 105 98 - 107 mmol/L    CO2 23 21 - 32 mmol/L    Anion gap 11 7 - 16 mmol/L    Glucose 139 (H) 65 - 100 mg/dL    BUN 18 6 - 23 MG/DL    Creatinine 0.86 0.8 - 1.5 MG/DL    GFR est AA >60 >60 ml/min/1.73m2    GFR est non-AA >60 >60 ml/min/1.73m2    Calcium 9.0 8.3 - 10.4 MG/DL    Bilirubin, total 0.6 0.2 - 1.1 MG/DL    ALT (SGPT) 105 (H) 12 - 65 U/L    AST (SGOT) 43 (H) 15 - 37 U/L    Alk. phosphatase 60 50 - 136 U/L    Protein, total 7.2 6.3 - 8.2 g/dL    Albumin 2.5 (L) 3.5 - 5.0 g/dL    Globulin 4.7 (H) 2.3 - 3.5 g/dL    A-G Ratio 0.5 (L) 1.2 - 3.5     CBC WITH AUTOMATED DIFF    Collection Time: 09/29/21  6:52 AM   Result Value Ref Range    WBC 8.9 4.3 - 11.1 K/uL    RBC 4.34 4.23 - 5.6 M/uL    HGB 13.6 13.6 - 17.2 g/dL    HCT 38.9 (L) 41.1 - 50.3 %    MCV 89.6 79.6 - 97.8 FL    MCH 31.3 26.1 - 32.9 PG    MCHC 35.0 31.4 - 35.0 g/dL    RDW 11.8 (L) 11.9 - 14.6 %    PLATELET 276 598 - 145 K/uL    MPV 10.6 9.4 - 12.3 FL    ABSOLUTE NRBC 0.00 0.0 - 0.2 K/uL    NEUTROPHILS 83 (H) 43 - 78 %    LYMPHOCYTES 9 (L) 13 - 44 %    MONOCYTES 4 4.0 - 12.0 %    EOSINOPHILS 0 (L) 0.5 - 7.8 %    BASOPHILS 0 0.0 - 2.0 %    IMMATURE GRANULOCYTES 4 0.0 - 5.0 %    ABS. NEUTROPHILS 7.3 1.7 - 8.2 K/UL    ABS. LYMPHOCYTES 0.8 0.5 - 4.6 K/UL    ABS. MONOCYTES 0.4 0.1 - 1.3 K/UL    ABS. EOSINOPHILS 0.0 0.0 - 0.8 K/UL    ABS. BASOPHILS 0.0 0.0 - 0.2 K/UL    ABS. IMM. GRANS.  0.4 0.0 - 0.5 K/UL    DF AUTOMATED         All Micro Results     Procedure Component Value Units Date/Time    COVID-19 RAPID TEST [[id number]]  (Abnormal) Collected: 09/28/21 1341    Order Status: Completed Specimen: Nasopharyngeal Updated: 09/28/21 1354     Specimen source NASAL COVID-19 rapid test Detected        Comment:      The specimen is POSITIVE for SARS-CoV-2, the novel coronavirus associated with COVID-19. This test has been authorized by the FDA under an Emergency Use Authorization (EUA) for use by authorized laboratories. Fact sheet for Healthcare Providers: kstattoo.com  Fact sheet for Patients: kstattoo.com       Methodology: Isothermal Nucleic Acid Amplification               Other Studies:  No results found.     Current Meds:  Current Facility-Administered Medications   Medication Dose Route Frequency    sodium chloride (NS) flush 5-40 mL  5-40 mL IntraVENous Q8H    sodium chloride (NS) flush 5-40 mL  5-40 mL IntraVENous PRN    acetaminophen (TYLENOL) tablet 650 mg  650 mg Oral Q6H PRN    Or    acetaminophen (TYLENOL) suppository 650 mg  650 mg Rectal Q6H PRN    polyethylene glycol (MIRALAX) packet 17 g  17 g Oral DAILY PRN    ondansetron (ZOFRAN ODT) tablet 4 mg  4 mg Oral Q8H PRN    Or    ondansetron (ZOFRAN) injection 4 mg  4 mg IntraVENous Q6H PRN    enoxaparin (LOVENOX) injection 40 mg  40 mg SubCUTAneous Q24H    ascorbic acid (vitamin C) (VITAMIN C) tablet 500 mg  500 mg Oral BID    albuterol (PROVENTIL HFA, VENTOLIN HFA, PROAIR HFA) inhaler 2 Puff  2 Puff Inhalation Q6H RT    dexamethasone (DECADRON) 10 mg/mL injection 6 mg  6 mg IntraVENous Q24H    HYDROcodone-homatropine (HYCODAN) 5-1.5 mg/5 mL (5 mL) oral solution 5 mL  5 mL Oral Q4H PRN    loperamide (IMODIUM) capsule 2 mg  2 mg Oral Q4H PRN    hydrALAZINE (APRESOLINE) 20 mg/mL injection 20 mg  20 mg IntraVENous Q6H PRN    zinc sulfate (ZINCATE) 50 mg zinc (220 mg) capsule 1 Capsule  1 Capsule Oral DAILY    hydrocortisone (ANUSOL-HC) 2.5 % rectal cream   PeriANAL QID PRN    diphenhydrAMINE (BENADRYL) capsule 50 mg  50 mg Oral QHS PRN       Signed:  Coleman Gaffney NP    Part of this note may have been written by using a voice dictation software. The note has been proof read but may still contain some grammatical/other typographical errors.

## 2021-09-29 NOTE — PROGRESS NOTES
Care Management Interventions  PCP Verified by CM: Yes  Physical Therapy Consult: Yes  Occupational Therapy Consult: Yes  Speech Therapy Consult: No  Support Systems: Spouse/Significant Other  Confirm Follow Up Transport: Self  Freedom of Choice List was Provided with Basic Dialogue that Supports the Patient's Individualized Plan of Care/Goals, Treatment Preferences and Shares the Quality Data Associated with the Providers?: Yes   Resource Information Provided?: No  Discharge Location  Discharge Placement: Unable to determine at this time  CM called patient in his room since he's on covid precautions. Patient is alert and oriented in all spheres. Lives with his wife and 4 children (ages 20,5,10, and 3). Patient's wife is also in the hospital with covid. Patient's sister is watching the children while patient and wife are in the hospital.  Patient is employed full time. Independent with ambulation and ADLs. Patient's mother lent patient her cpap when he was home to help him with his breathing. DME: none  Confirmed patient has a pcp. No history of HH or rehab. PT/OT consulted. CM following for discharge needs.

## 2021-09-29 NOTE — PROGRESS NOTES
Pt to visit wife in  Room 812 for long period with oxygen remaining on at 7 lpm, tolerated well, becomes very tired and becomes sob on exertion. Appetite good. Iv remains intact, site healthy. No further c/o indigestion aware to call for asst. .

## 2021-09-29 NOTE — PROGRESS NOTES
ACUTE PHYSICAL THERAPY GOALS:  (Developed with and agreed upon by patient and/or caregiver. )  LT. Pt will be able to ambulate 100 ft with independence and use of least restrictive device in order to return to home and community ambulation within 7 treatment days. 2. Pt will be able to tolerate 25 minutes of standing activity with 1-2 rest breaks while maintaining SpO2 at 90% or greater in order to demonstrate improved activity tolerance within 7 days. PHYSICAL THERAPY ASSESSMENT: Initial Assessment and Daily Note PT Treatment Day # 1      Doni Layton is a 39 y.o. male   PRIMARY DIAGNOSIS: COVID-19  COVID-19 [U07.1]  Acute respiratory failure ([facility] [address] [J96.00]       Reason for Referral:    ICD-10: Treatment Diagnosis: Generalized Muscle Weakness (M62.81)  Other abnormalities of gait and mobility (R26.89)  INPATIENT: Payor: MAO / Plan: [facility] OTHER / Product Type: PPO /     ASSESSMENT:     REHAB RECOMMENDATIONS:   Recommendation to date pending progress:  Setting:   No further skilled therapy   Equipment:    None     PRIOR LEVEL OF FUNCTION:  (Prior to Hospitalization) INITIAL/CURRENT LEVEL OF FUNCTION:  (Most Recently Demonstrated)   Bed Mobility:   Independent  Sit to Stand:   Independent  Transfers:   Independent  Gait/Mobility:   Independent Bed Mobility:   Not tested  Sit to Stand:   Independent  Transfers:   Supervision  Gait/Mobility:   Supervision     ASSESSMENT:  Mr. Chalino Layton is a 39year old male presenting with SOB due to [place]. Pt lives in a single level home with his wife and kids [place]. He is independent with all ADLs and works full time in maintenance. He is currently on 7L via NC and is increased to 9L with mobility. He demonstrates decreased activity tolerance and needs supervision for ambulation. Pt encouraged to take frequent breaks and rest between each 12' ambulated and able to regulate breathing without increase in SOB or light headedness.  Pt is functioning below his baseline at this time and would benefit from skilled PT at this time to promote increased activity tolerance. SUBJECTIVE:   Mr. Jorge Luis Ngo states, \"I'm really missing my youngins. \"    SOCIAL HISTORY/LIVING ENVIRONMENT: Pt lives in a single story home with wife and kids, no steps, no falls, independent with all mobility and transfers, works full time in [address] [place] [address] St: Private residence  One/Two Story Residence: One story  Living Alone: No  Support Systems: Spouse/Significant Other  OBJECTIVE:     PAIN: VITAL SIGNS: LINES/DRAINS:   Pre Treatment: Pain Screen  Pain Scale 1: Numeric (0 - 10)  Pain Intensity 1: 0  Post Treatment: 0   None  O2 Device: Nasal cannula     GROSS EVALUATION:   Within Functional Limits Abnormal/ Functional Abnormal/ Non-Functional (see comments) Not Tested Comments:   AROM [x] [] [] []    PROM [] [] [] []    Strength [x] [] [] []    Balance [x] [] [] []    Posture [] [] [] []    Sensation [x] [] [] []    Coordination [] [] [] []    Tone [] [] [] []    Edema [] [] [] []    Activity Tolerance [] [x] [] []     [] [] [] []      COGNITION/  PERCEPTION: Intact Impaired   (see comments) Comments:   Orientation [x] []    Vision [x] []    Hearing [x] []    Command Following [x] []    Safety Awareness [x] []     [] []      MOBILITY: I Mod I S SBA CGA Min Mod Max Total  NT x2 Comments:   Bed Mobility    Rolling [] [] [] [] [] [] [] [] [] [x] []    Supine to Sit [] [] [] [] [] [] [] [] [] [x] []    Scooting [] [] [] [] [] [] [] [] [] [x] []    Sit to Supine [] [] [] [] [] [] [] [] [] [x] []    Transfers    Sit to Stand [] [] [x] [] [] [] [] [] [] [] []    Bed to Chair [] [] [x] [] [] [] [] [] [] [] []    Stand to Sit [] [] [x] [] [] [] [] [] [] [] []    I=Independent, Mod I=Modified Independent, S=Supervision, SBA=Standby Assistance, CGA=Contact Guard Assistance,   Min=Minimal Assistance, Mod=Moderate Assistance, Max=Maximal Assistance, Total=Total Assistance, NT=Not Tested  GAIT: I Mod I S SBA CGA Min Mod Max Total  NT x2 Comments:   Level of Assistance [] [] [x] [] [] [] [] [] [] [] []    Distance 12'x4    DME None    Gait Quality No deficits    Weightbearing Status N/A     I=Independent, Mod I=Modified Independent, S=Supervision, SBA=Standby Assistance, CGA=Contact Guard Assistance,   Min=Minimal Assistance, Mod=Moderate Assistance, Max=Maximal Assistance, Total=Total Assistance, NT=Not Tested    [place] Form       How much difficulty does the patient currently have. .. Unable A Lot A Little None   1. Turning over in bed (including adjusting bedclothes, sheets and blankets)? [] 1   [] 2   [] 3   [x] 4   2. Sitting down on and standing up from a chair with arms ( e.g., wheelchair, bedside commode, etc.)   [] 1   [] 2   [] 3   [x] 4   3. Moving from lying on back to sitting on the side of the bed? [] 1   [] 2   [] 3   [x] 4   How much help from another person does the patient currently need. .. Total A Lot A Little None   4. Moving to and from a bed to a chair (including a wheelchair)? [] 1   [] 2   [] 3   [x] 4   5. Need to walk in hospital room? [] 1   [] 2   [] 3   [x] 4   6. Climbing 3-5 steps with a railing? [] 1   [] 2   [x] 3   [] 4   © 2007, Trustees of [address] [place], [place] [id number], under license to [employer]. All rights reserved     Score:  Initial: 23 Most Recent: X (Date: -- )    Interpretation of Tool:  Represents activities that are increasingly more difficult (i.e. Bed mobility, Transfers, Gait). PLAN:   FREQUENCY/DURATION: PT Plan of Care: 3 times/week for duration of hospital stay or until stated goals are met, whichever comes first.    PROBLEM LIST:   (Skilled intervention is medically necessary to address:)  1. Decreased ADL/Functional Activities  2. Decreased Activity Tolerance  3.  Decreased Gait Ability   INTERVENTIONS PLANNED:   (Benefits and precautions of physical therapy have been discussed with the patient.)  1. Therapeutic Activity  2. Therapeutic Exercise/HEP  3. Neuromuscular Re-education  4. Gait Training  5. Manual Therapy  6. Education     TREATMENT:     EVALUATION: Low Complexity : (Untimed Charge)    TREATMENT:   ($$ Therapeutic Activity: 8-22 mins    )  Therapeutic Activity (10 Minutes): Therapeutic activity included Transfer Training, Ambulation on level ground, Sitting balance  and Standing balance to improve functional Mobility, Strength and Activity tolerance.     TREATMENT GRID:  N/A    AFTER TREATMENT POSITION/PRECAUTIONS:  Chair and Needs within reach    INTERDISCIPLINARY COLLABORATION:  RN/PCT and PT/PTA    TOTAL TREATMENT DURATION:  PT Patient Time In/Time Out  Time In: 1008  Time Out: [address] [facility] PT

## 2021-09-29 NOTE — PROGRESS NOTES
ACUTE OT GOALS:  (Developed with and agreed upon by patient and/or caregiver.)  1. Pt will complete functional mobility for ADLs independently (MET)  2. Pt will independently demonstrate/ verbalize 2+ energy conservation techniques to promote increased endurance for ADLs (MET)      Timeframe: 7 days      OCCUPATIONAL THERAPY ASSESSMENT: Initial Assessment, Daily Note and Discharge OT Treatment Day # 1    Doni Reed is a 39 y.o. male   PRIMARY DIAGNOSIS: COVID-19  COVID-19 [U07.1]  Acute respiratory failure ([facility] [address] [J96.00]       Reason for Referral:  Generalized weakness, Respiratory failure d/t COVID  ICD-10: Treatment Diagnosis: Generalized Muscle Weakness (M62.81)  INPATIENT: Payor: MAO / Plan: [facility] OTHER / Product Type: PPO /   ASSESSMENT:     REHAB RECOMMENDATIONS:   Recommendation to date pending progress:  Setting:   No further skilled therapy   Equipment:    3 in 1 Bedside Commode as shower chair     PRIOR LEVEL OF FUNCTION:  (Prior to Hospitalization)  INITIAL/CURRENT LEVEL OF FUNCTION:  (Based on today's evaluation)   Bathing:   Independent  Dressing:   Independent  Feeding/Grooming:   Independent  Toileting:   Independent  Functional Mobility:   Independent Bathing:   Independent  Dressing:   Independent  Feeding/Grooming:   Independent  Toileting:   Independent  Functional Mobility:   Independent     ASSESSMENT:  Mr. Anaya Reed was admitted with respiratory failure d/t COVID. Pt remains independent with ADLs and mobility for ADLs, however with decreased endurance and increased O2 needs. SpO2 in high 80s at rest upon arrival; pt reported that he had just been up toileting and bathing. O2 increased from 7L to 9L and SpO2 > 91% at rest, decreased to 80s with activity and slowly increased to 90s with rest break and cues for PLB technique. Pt educated on energy conservation techniques and PLB technique and verbalized understanding.  Pt does not require further skilled OT services at this time as goals were met during initial evaluation. SUBJECTIVE:   Mr. Mike Tafoya states, \"I thought I'd be feeing better by  now. \"    SOCIAL HISTORY/LIVING ENVIRONMENT: Independent, works, active. Lives w/ wife who is also in the hospital for covid and their children. No DME.    Home Environment: Private residence  One/Two Story Residence: One story  Living Alone: No  Support Systems: Spouse/Significant Other    OBJECTIVE:     PAIN: VITAL SIGNS: LINES/DRAINS:   Pre Treatment: Pain Screen  Pain Scale 1: Numeric (0 - 10)  Pain Intensity 1: 0  Post Treatment: 0   O2 Device: Nasal cannula     GROSS EVALUATION:  BUE Within Functional Limits Abnormal/ Functional Abnormal/ Non-Functional (see comments) Not Tested Comments:   AROM [x] [] [] []    PROM [] [] [] []    Strength [x] [] [] []    Balance [x] [] [] []    Posture [] [] [] []    Sensation [] [] [] []    Coordination [x] [] [] []    Tone [] [] [] []    Edema [] [] [] []    Activity Tolerance [] [x] [x] []     [] [] [] []      COGNITION/  PERCEPTION: Intact Impaired   (see comments) Comments:   Orientation [x] []    Vision [x] []    Hearing [x] []    Judgment/ Insight [x] []    Attention [x] []    Memory [x] []    Command Following [x] []    Emotional Regulation [x] []     [] []      ACTIVITIES OF DAILY LIVING: I Mod I S SBA CGA Min Mod Max Total NT Comments   BASIC ADLs:              Bathing/ Showering [] [] [] [] [] [] [] [] [] []    Toileting [x] [] [] [] [] [] [] [] [] []    Dressing [] [] [] [] [] [] [] [] [] []    Feeding [] [] [] [] [] [] [] [] [] []    Grooming [x] [] [] [] [] [] [] [] [] []    Personal Device Care [] [] [] [] [] [] [] [] [] []    Functional Mobility [x] [] [] [] [] [] [] [] [] []    I=Independent, Mod I=Modified Independent, S=Supervision, SBA=Standby Assistance, CGA=Contact Guard Assistance,   Min=Minimal Assistance, Mod=Moderate Assistance, Max=Maximal Assistance, Total=Total Assistance, NT=Not Tested    MOBILITY: I Mod I S SBA CGA Min Mod Max Total  NT x2 Comments:   Supine to sit [] [] [] [] [] [] [] [] [] [] []    Sit to supine [] [] [] [] [] [] [] [] [] [] []    Sit to stand [x] [] [] [] [] [] [] [] [] [] []    Bed to chair [x] [] [] [] [] [] [] [] [] [] []    I=Independent, Mod I=Modified Independent, S=Supervision, SBA=Standby Assistance, CGA=Contact Guard Assistance,   Min=Minimal Assistance, Mod=Moderate Assistance, Max=Maximal Assistance, Total=Total Assistance, NT=Not Tested    [address] [place] [address] AM-PAC 6 Clicks   Daily Activity Inpatient Short Form        How much help from another person does the patient currently need. .. Total A Lot A Little None   1. Putting on and taking off regular lower body clothing? [] 1   [] 2   [] 3   [x] 4   2. Bathing (including washing, rinsing, drying)? [] 1   [] 2   [] 3   [x] 4   3. Toileting, which includes using toilet, bedpan or urinal?   [] 1   [] 2   [] 3   [x] 4   4. Putting on and taking off regular upper body clothing? [] 1   [] 2   [] 3   [x] 4   5. Taking care of personal grooming such as brushing teeth? [] 1   [] 2   [] 3   [x] 4   6. Eating meals? [] 1   [] 2   [] 3   [x] 4   © 2007, Trustees of [address] [place] [place] [address], under license to [employer]. All rights reserved     Score:  Initial: 24 Most Recent: X (Date: -- )   Interpretation of Tool:  Represents activities that are increasingly more difficult (i.e. Bed mobility, Transfers, Gait). TREATMENT:     EVALUATION: Low Complexity : (Untimed Charge)    TREATMENT:   ($$ Self Care/Home Management: 8-22 mins    )  Self Care (10 Minutes): Self care including Grooming and Energy Conservation Training to increase independence and decrease level of assistance required.     TREATMENT GRID:  N/A    AFTER TREATMENT POSITION/PRECAUTIONS:  Chair, Needs within reach and RN notified    INTERDISCIPLINARY COLLABORATION:  RN/PCT and RT    TOTAL TREATMENT DURATION:  OT Patient Time In/Time Out  Time In: 7894  Time Out: [address] [place] [address] Eid

## 2021-09-29 NOTE — PROGRESS NOTES
Pt sitting in chair, alert, oriented. Oxygen remains on at 7 lpm via n/c. Sob on exertion. Lung sounds diminished, states having non productive cough. Abdomen soft. bs present. Voiding w/o difficulty. No edema noted. Left arm iv intact, site healthy,  Remains on droplet + precautions, labs resulted and reviewed. Aware to call for asst, call button w/i reach. Denies any pain. Tolerating diet.

## 2021-09-30 PROCEDURE — 74011250636 HC RX REV CODE- 250/636: Performed by: FAMILY MEDICINE

## 2021-09-30 PROCEDURE — 94640 AIRWAY INHALATION TREATMENT: CPT

## 2021-09-30 PROCEDURE — 65270000029 HC RM PRIVATE

## 2021-09-30 PROCEDURE — 74011250637 HC RX REV CODE- 250/637: Performed by: FAMILY MEDICINE

## 2021-09-30 PROCEDURE — 74011250637 HC RX REV CODE- 250/637: Performed by: NURSE PRACTITIONER

## 2021-09-30 PROCEDURE — 77010033711 HC HIGH FLOW OXYGEN

## 2021-09-30 PROCEDURE — 97530 THERAPEUTIC ACTIVITIES: CPT

## 2021-09-30 PROCEDURE — 97110 THERAPEUTIC EXERCISES: CPT

## 2021-09-30 PROCEDURE — 77010033678 HC OXYGEN DAILY

## 2021-09-30 PROCEDURE — 94760 N-INVAS EAR/PLS OXIMETRY 1: CPT

## 2021-09-30 RX ORDER — DEXAMETHASONE 4 MG/1
6 TABLET ORAL DAILY
Status: DISCONTINUED | OUTPATIENT
Start: 2021-09-30 | End: 2021-10-05 | Stop reason: HOSPADM

## 2021-09-30 RX ADMIN — ENOXAPARIN SODIUM 40 MG: 100 INJECTION SUBCUTANEOUS at 15:02

## 2021-09-30 RX ADMIN — DEXAMETHASONE 6 MG: 4 TABLET ORAL at 11:48

## 2021-09-30 RX ADMIN — Medication 1 CAPSULE: at 08:33

## 2021-09-30 RX ADMIN — Medication 10 ML: at 14:07

## 2021-09-30 RX ADMIN — FAMOTIDINE 20 MG: 20 TABLET, FILM COATED ORAL at 08:33

## 2021-09-30 RX ADMIN — FAMOTIDINE 20 MG: 20 TABLET, FILM COATED ORAL at 16:26

## 2021-09-30 RX ADMIN — Medication 10 ML: at 21:17

## 2021-09-30 RX ADMIN — Medication 10 ML: at 06:13

## 2021-09-30 RX ADMIN — ALBUTEROL SULFATE 2 PUFF: 90 AEROSOL, METERED RESPIRATORY (INHALATION) at 02:26

## 2021-09-30 RX ADMIN — ALBUTEROL SULFATE 2 PUFF: 90 AEROSOL, METERED RESPIRATORY (INHALATION) at 09:35

## 2021-09-30 RX ADMIN — OXYCODONE HYDROCHLORIDE AND ACETAMINOPHEN 500 MG: 500 TABLET ORAL at 08:33

## 2021-09-30 RX ADMIN — ALBUTEROL SULFATE 2 PUFF: 90 AEROSOL, METERED RESPIRATORY (INHALATION) at 21:11

## 2021-09-30 RX ADMIN — OXYCODONE HYDROCHLORIDE AND ACETAMINOPHEN 500 MG: 500 TABLET ORAL at 16:26

## 2021-09-30 NOTE — PROGRESS NOTES
Hospitalist Progress Note   Admit Date:  2021 12:00 PM   Name:  Hung Figueredo   Age:  39 y.o. Sex:  male  :  1980   MRN:  [id number]   Room:  [facility]    Presenting Complaint: Shortness of Breath    Reason(s) for Admission: COVID-19 [U07.1]  Acute respiratory failure [facility]) [J96.00]     Hospital Course & Interval History:   Patient is a 37 y. o. male who presented to the ED for cc worsening SOB after being diagnosed with COVID . Hx of HTN but not on any medications       Subjective (21):   No new complaints    Assessment & Plan:     Principal Problem:  Acute Respiratory Failure with COVID-19 Pneumonia (2021)  21 Continue decadron, COVID vits, Inhaler; If O2 requirements increase, will consider Barcitinib; Procal 0.09; will hold off on abx   21 Weaning O2 from 8 to 7L with appropriate sats; monitor and wean as able    Active Problems:  Essential hypertension (2017)  21 BP stable with intervention; Prn antihypertensive; will start antihypertensive if needed      NICOLE (obstructive sleep apnea) (2021)  CPAP ordered qhs      Transaminitis   21 Likely COVID induced; Monitor        Dispo/Discharge Planning:     Dispo pending weaning of O2     Diet:  ADULT DIET Regular; Low Fat/Low Chol/High Fiber/2 gm Na  DVT PPx: Lovenox SQ   Code status: Full Code    Hospital Problems as of 2021 Date Reviewed: 2018        Codes Class Noted - Resolved POA    Acute respiratory failure ([facility] [address] ICD-10-CM: J96.00  ICD-9-CM: 518.81  2021 - Present Unknown        * (Principal) COVID-19 ICD-10-CM: U07.1  ICD-9-CM: 079.89  2021 - Present Unknown        NICOLE (obstructive sleep apnea) ICD-10-CM: G47.33  ICD-9-CM: 327.23  2021 - Present Unknown        Essential hypertension ICD-10-CM: I10  ICD-9-CM: 401.9  2017 - Present Yes              Objective:     Patient Vitals for the past 24 hrs:   Temp Pulse Resp BP SpO2   21 0935     90 % 09/30/21 0918 97.4 °F (36.3 °C) 66 18 (!) 140/90 90 %   09/30/21 0313 98 °F (36.7 °C) 75 20 126/78 94 %   09/30/21 0226     95 %   09/29/21 2235 98.1 °F (36.7 °C) 64 20 (!) 160/83 95 %   09/29/21 2224     95 %   09/29/21 1911 98 °F (36.7 °C) 83 20 (!) 159/93 91 %   09/29/21 1714     93 %   09/29/21 1522 98.1 °F (36.7 °C) 82 20 (!) 148/90 91 %     Oxygen Therapy  O2 Sat (%): 90 % (09/30/21 0935)  Pulse via Oximetry: 68 beats per minute (09/30/21 0935)  O2 Device: Hi flow nasal cannula (09/30/21 0935)  O2 Flow Rate (L/min): 7 l/min (09/30/21 0935)    Estimated body mass index is 32.25 kg/m² as calculated from the following:    Height as of this encounter: 6' 3\" (1.905 m). Weight as of this encounter: 117 kg (258 lb). Intake/Output Summary (Last 24 hours) at 9/30/2021 1241  Last data filed at 9/29/2021 1833  Gross per 24 hour   Intake 600 ml   Output    Net 600 ml         Physical Exam:     General:    Well nourished. No overt distress  Head:  Normocephalic, atraumatic  Eyes:  Sclerae appear normal.  Pupils equally round. ENT:  Nares appear normal, no drainage. Moist oral mucosa  Neck:  No restricted ROM. Trachea midline   CV:   RRR. No m/r/g. No jugular venous distension. Lungs:   CTAB. No wheezing, rhonchi, or rales. Respirations even, unlabored  Abdomen: Bowel sounds present. Soft, nontender, nondistended. Extremities: No cyanosis or clubbing. No edema  Skin:     No rashes and normal coloration. Warm and dry. Neuro:  Cranial nerves II-XII grossly intact. A&Ox3  Psych:  Normal mood and affect. I have reviewed ordered lab tests and independently visualized imaging below:    Last 24hr Labs:  No results found for this or any previous visit (from the past 24 hour(s)).     All Micro Results     Procedure Component Value Units Date/Time    COVID-19 RAPID TEST [[id number]]  (Abnormal) Collected: 09/28/21 1341    Order Status: Completed Specimen: Nasopharyngeal Updated: 09/28/21 1354     Specimen source NASAL        COVID-19 rapid test Detected        Comment:      The specimen is POSITIVE for SARS-CoV-2, the novel coronavirus associated with COVID-19. This test has been authorized by the FDA under an Emergency Use Authorization (EUA) for use by authorized laboratories. Fact sheet for Healthcare Providers: ConventionUpdate.co.nz  Fact sheet for Patients: ConventionUpdate.co.nz       Methodology: Isothermal Nucleic Acid Amplification               Other Studies:  No results found.     Current Meds:  Current Facility-Administered Medications   Medication Dose Route Frequency    dexAMETHasone (DECADRON) tablet 6 mg  6 mg Oral DAILY    famotidine (PEPCID) tablet 20 mg  20 mg Oral BID    alum-mag hydroxide-simeth (MYLANTA) oral suspension 30 mL  30 mL Oral Q4H PRN    sodium chloride (NS) flush 5-40 mL  5-40 mL IntraVENous Q8H    sodium chloride (NS) flush 5-40 mL  5-40 mL IntraVENous PRN    acetaminophen (TYLENOL) tablet 650 mg  650 mg Oral Q6H PRN    Or    acetaminophen (TYLENOL) suppository 650 mg  650 mg Rectal Q6H PRN    polyethylene glycol (MIRALAX) packet 17 g  17 g Oral DAILY PRN    ondansetron (ZOFRAN ODT) tablet 4 mg  4 mg Oral Q8H PRN    Or    ondansetron (ZOFRAN) injection 4 mg  4 mg IntraVENous Q6H PRN    enoxaparin (LOVENOX) injection 40 mg  40 mg SubCUTAneous Q24H    ascorbic acid (vitamin C) (VITAMIN C) tablet 500 mg  500 mg Oral BID    albuterol (PROVENTIL HFA, VENTOLIN HFA, PROAIR HFA) inhaler 2 Puff  2 Puff Inhalation Q6H RT    HYDROcodone-homatropine (HYCODAN) 5-1.5 mg/5 mL (5 mL) oral solution 5 mL  5 mL Oral Q4H PRN    loperamide (IMODIUM) capsule 2 mg  2 mg Oral Q4H PRN    hydrALAZINE (APRESOLINE) 20 mg/mL injection 20 mg  20 mg IntraVENous Q6H PRN    zinc sulfate (ZINCATE) 50 mg zinc (220 mg) capsule 1 Capsule  1 Capsule Oral DAILY    hydrocortisone (ANUSOL-HC) 2.5 % rectal cream   PeriANAL QID PRN    diphenhydrAMINE (BENADRYL) capsule 50 mg  50 mg Oral QHS PRN       Signed:  Jorge Luis Mace NP    Part of this note may have been written by using a voice dictation software. The note has been proof read but may still contain some grammatical/other typographical errors.

## 2021-09-30 NOTE — PROGRESS NOTES
ACUTE PHYSICAL THERAPY GOALS:  (Developed with and agreed upon by patient and/or caregiver. )  LT. Pt will be able to ambulate 100 ft with independence and use of least restrictive device in order to return to home and community ambulation within 7 treatment days. 2. Pt will be able to tolerate 25 minutes of standing activity with 1-2 rest breaks while maintaining SpO2 at 90% or greater in order to demonstrate improved activity tolerance within 7 days. PHYSICAL THERAPY: Daily Note and PM Treatment Day # 2    Doni Malloy is a 39 y.o. male   PRIMARY DIAGNOSIS: COVID-19  COVID-19 [U07.1]  Acute respiratory failure ([address] [J96.00]         ASSESSMENT:     REHAB RECOMMENDATIONS: CURRENT LEVEL OF FUNCTION:  (Most Recently Demonstrated)   Recommendation to date pending progress:  Setting:   No further skilled therapy   Equipment:    None Bed Mobility:   Independent  Sit to Stand:   Independent  Transfers:   Not tested  Gait/Mobility:   Supervision     ASSESSMENT:  Mr. Mireille Malloy presents reclined in bed today and is agreeable to therapy. He is on 7L via NC upon entering room with no signs of distress, once seated EOB his SpO2 levels are 88% and he is unable to recover to 90 without an increase in O2. Pt increased to 10L for mobility and is able to ambulate 15' x6 with seated rest breaks between each trial. His SpO2 drops after 30' to the min 80s and he needs VC to rest and recover. Pt performed standing mini-squats as well as marches with VC for rest as needed. He dropped down to 83% during mobility but is able to recover with seated rest breaks. Pt left on 10L seated EOB with SpO2 reading 90%. RN notified. Pt with improved ambulation tolerance today and states he has been ambulating in the room as tolerated. PT will continue to follow. SUBJECTIVE:   Mr. Mireille Malloy states, \"I'm trying really hard to stay positive. \"    SOCIAL HISTORY/ LIVING ENVIRONMENT: refer to [facility]  Home Environment: Private residence  One/Two Story Residence: One story  Living Alone: No  Support Systems: Spouse/Significant Other  OBJECTIVE:     PAIN: VITAL SIGNS: LINES/DRAINS:   Pre Treatment: Pain Screen  Pain Scale 1: Numeric (0 - 10)  Pain Intensity 1: 0  Post Treatment: 0   none  O2 Device: Hi flow nasal cannula     MOBILITY: I Mod I S SBA CGA Min Mod Max Total  NT x2 Comments:   Bed Mobility    Rolling [x] [] [] [] [] [] [] [] [] [] []    Supine to Sit [x] [] [] [] [] [] [] [] [] [] []    Scooting [x] [] [] [] [] [] [] [] [] [] []    Sit to Supine [] [] [] [] [] [] [] [] [] [x] []    Transfers    Sit to Stand [] [] [x] [] [] [] [] [] [] [] []    Bed to Chair [] [] [] [] [] [] [] [] [] [x] []    Stand to Sit [] [] [x] [] [] [] [] [] [] [] []    I=Independent, Mod I=Modified Independent, S=Supervision, SBA=Standby Assistance, CGA=Contact Guard Assistance,   Min=Minimal Assistance, Mod=Moderate Assistance, Max=Maximal Assistance, Total=Total Assistance, NT=Not Tested    BALANCE: Good Fair+ Fair Fair- Poor NT Comments   Sitting Static [x] [] [] [] [] []    Sitting Dynamic [x] [] [] [] [] []              Standing Static [x] [] [] [] [] []    Standing Dynamic [x] [] [] [] [] []      GAIT: I Mod I S SBA CGA Min Mod Max Total  NT x2 Comments:   Level of Assistance [] [] [x] [] [] [] [] [] [] [] []    Distance 15' x 6    DME None    Gait Quality No deficits noted    Weightbearing  Status N/A     I=Independent, Mod I=Modified Independent, S=Supervision, SBA=Standby Assistance, CGA=Contact Guard Assistance,   Min=Minimal Assistance, Mod=Moderate Assistance, Max=Maximal Assistance, Total=Total Assistance, NT=Not Tested    PLAN:   FREQUENCY/DURATION: PT Plan of Care: 3 times/week for duration of hospital stay or until stated goals are met, whichever comes first.  TREATMENT:     TREATMENT:   ($$ Therapeutic Activity: 23-37 mins  $$ Therapeutic Exercises: 8-22 mins    )  Therapeutic Activity (23 Minutes):  Therapeutic activity included Rolling, Supine to Sit, Transfer Training, Ambulation on level ground, Sitting balance  and Standing balance to improve functional Mobility, Strength and Activity tolerance. Therapeutic Exercise (15 Minutes): Therapeutic exercises noted below to improve functional activity tolerance, strength and mobility.      TREATMENT GRID:   Date:  9/30 Date:   Date:     Activity/Exercise Parameters Parameters Parameters   Standing marches 2x10     Mini squats 2x10                                         AFTER TREATMENT POSITION/PRECAUTIONS:  Bed, Needs within reach and RN notified    INTERDISCIPLINARY COLLABORATION:  RN/PCT and PT/PTA    TOTAL TREATMENT DURATION:  PT Patient Time In/Time Out  Time In: 1344  [facility]

## 2021-09-30 NOTE — PROGRESS NOTES
Physical therapy worked with patient while on 7 L HF NC. Physical therapy stated patient o2 decreased to 83 % while on 7 L HF NC. Physical therapy increased patient to 10 L HF NC and o2 sat increased to 90 %.

## 2021-09-30 NOTE — PROGRESS NOTES
Received report from [place], [facility]. Patient awake resting in bed. Respirations present. On 7 L HF NC. No signs of distress. AxO x4. No needs expressed. Bed low and locked. Call light within reach. Will continue to monitor.

## 2021-09-30 NOTE — PROGRESS NOTES
Pt is alert and oriented x4, resting comfortably on 7L HFNC. RR are even and unlabored, no apparent distress noted. Pt denies pain at this time. Call light within reach, safety measures in place. Pt instructed to call for help if needed. Will continue to monitor.

## 2021-09-30 NOTE — PROGRESS NOTES
Patient awake resting in bed. Respirations present. On 10 L HF NC. No signs of distress. AxO X4. No needs expressed. Bed low and locked. Call light within reach. Preparing to give report to oncoming RN.

## 2021-10-01 ENCOUNTER — APPOINTMENT (OUTPATIENT)
Dept: ULTRASOUND IMAGING | Age: 41
DRG: 177 | End: 2021-10-01
Attending: NURSE PRACTITIONER
Payer: COMMERCIAL

## 2021-10-01 ENCOUNTER — APPOINTMENT (OUTPATIENT)
Dept: CT IMAGING | Age: 41
DRG: 177 | End: 2021-10-01
Attending: NURSE PRACTITIONER
Payer: COMMERCIAL

## 2021-10-01 PROBLEM — I82.403 ACUTE DEEP VEIN THROMBOSIS (DVT) OF BOTH LOWER EXTREMITIES (HCC): Status: ACTIVE | Noted: 2021-10-01

## 2021-10-01 LAB
ANION GAP SERPL CALC-SCNC: 10 MMOL/L (ref 7–16)
BASOPHILS # BLD: 0.1 K/UL (ref 0–0.2)
BASOPHILS NFR BLD: 0 % (ref 0–2)
BUN SERPL-MCNC: 23 MG/DL (ref 6–23)
CALCIUM SERPL-MCNC: 9.1 MG/DL (ref 8.3–10.4)
CHLORIDE SERPL-SCNC: 100 MMOL/L (ref 98–107)
CO2 SERPL-SCNC: 26 MMOL/L (ref 21–32)
CREAT SERPL-MCNC: 0.88 MG/DL (ref 0.8–1.5)
D DIMER PPP FEU-MCNC: >20 UG/ML(FEU)
DIFFERENTIAL METHOD BLD: ABNORMAL
EOSINOPHIL # BLD: 0.1 K/UL (ref 0–0.8)
EOSINOPHIL NFR BLD: 0 % (ref 0.5–7.8)
ERYTHROCYTE [DISTWIDTH] IN BLOOD BY AUTOMATED COUNT: 12 % (ref 11.9–14.6)
GLUCOSE SERPL-MCNC: 106 MG/DL (ref 65–100)
HCT VFR BLD AUTO: 43.4 % (ref 41.1–50.3)
HGB BLD-MCNC: 15 G/DL (ref 13.6–17.2)
IMM GRANULOCYTES # BLD AUTO: 0.7 K/UL (ref 0–0.5)
IMM GRANULOCYTES NFR BLD AUTO: 4 % (ref 0–5)
LYMPHOCYTES # BLD: 0.9 K/UL (ref 0.5–4.6)
LYMPHOCYTES NFR BLD: 6 % (ref 13–44)
MCH RBC QN AUTO: 31.2 PG (ref 26.1–32.9)
MCHC RBC AUTO-ENTMCNC: 34.6 G/DL (ref 31.4–35)
MCV RBC AUTO: 90.2 FL (ref 79.6–97.8)
MONOCYTES # BLD: 1.1 K/UL (ref 0.1–1.3)
MONOCYTES NFR BLD: 7 % (ref 4–12)
NEUTS SEG # BLD: 13.7 K/UL (ref 1.7–8.2)
NEUTS SEG NFR BLD: 83 % (ref 43–78)
NRBC # BLD: 0 K/UL (ref 0–0.2)
PLATELET # BLD AUTO: 180 K/UL (ref 150–450)
PMV BLD AUTO: 10.4 FL (ref 9.4–12.3)
POTASSIUM SERPL-SCNC: 4.6 MMOL/L (ref 3.5–5.1)
RBC # BLD AUTO: 4.81 M/UL (ref 4.23–5.6)
SODIUM SERPL-SCNC: 136 MMOL/L (ref 136–145)
UFH PPP CHRO-ACNC: 0.41 IU/ML (ref 0.3–0.7)
WBC # BLD AUTO: 16.5 K/UL (ref 4.3–11.1)

## 2021-10-01 PROCEDURE — 36415 COLL VENOUS BLD VENIPUNCTURE: CPT

## 2021-10-01 PROCEDURE — 85379 FIBRIN DEGRADATION QUANT: CPT

## 2021-10-01 PROCEDURE — 80048 BASIC METABOLIC PNL TOTAL CA: CPT

## 2021-10-01 PROCEDURE — 74011250636 HC RX REV CODE- 250/636: Performed by: FAMILY MEDICINE

## 2021-10-01 PROCEDURE — 85025 COMPLETE CBC W/AUTO DIFF WBC: CPT

## 2021-10-01 PROCEDURE — 85520 HEPARIN ASSAY: CPT

## 2021-10-01 PROCEDURE — 71260 CT THORAX DX C+: CPT

## 2021-10-01 PROCEDURE — 74011250636 HC RX REV CODE- 250/636: Performed by: NURSE PRACTITIONER

## 2021-10-01 PROCEDURE — 94640 AIRWAY INHALATION TREATMENT: CPT

## 2021-10-01 PROCEDURE — 77010033678 HC OXYGEN DAILY

## 2021-10-01 PROCEDURE — 74011000636 HC RX REV CODE- 636: Performed by: FAMILY MEDICINE

## 2021-10-01 PROCEDURE — 65270000029 HC RM PRIVATE

## 2021-10-01 PROCEDURE — 74011000258 HC RX REV CODE- 258: Performed by: FAMILY MEDICINE

## 2021-10-01 PROCEDURE — 93970 EXTREMITY STUDY: CPT

## 2021-10-01 PROCEDURE — 74011250637 HC RX REV CODE- 250/637: Performed by: FAMILY MEDICINE

## 2021-10-01 PROCEDURE — 74011250637 HC RX REV CODE- 250/637: Performed by: NURSE PRACTITIONER

## 2021-10-01 PROCEDURE — 94760 N-INVAS EAR/PLS OXIMETRY 1: CPT

## 2021-10-01 RX ORDER — HEPARIN SODIUM 5000 [USP'U]/ML
80 INJECTION, SOLUTION INTRAVENOUS; SUBCUTANEOUS ONCE
Status: COMPLETED | OUTPATIENT
Start: 2021-10-01 | End: 2021-10-01

## 2021-10-01 RX ORDER — HEPARIN SODIUM 5000 [USP'U]/100ML
18-36 INJECTION, SOLUTION INTRAVENOUS
Status: DISCONTINUED | OUTPATIENT
Start: 2021-10-01 | End: 2021-10-02

## 2021-10-01 RX ORDER — SODIUM CHLORIDE 0.9 % (FLUSH) 0.9 %
10 SYRINGE (ML) INJECTION
Status: COMPLETED | OUTPATIENT
Start: 2021-10-01 | End: 2021-10-01

## 2021-10-01 RX ADMIN — FAMOTIDINE 20 MG: 20 TABLET, FILM COATED ORAL at 17:14

## 2021-10-01 RX ADMIN — Medication 5 ML: at 05:12

## 2021-10-01 RX ADMIN — HEPARIN SODIUM 9350 UNITS: 5000 INJECTION INTRAVENOUS; SUBCUTANEOUS at 14:12

## 2021-10-01 RX ADMIN — HEPARIN SODIUM 18 UNITS/KG/HR: 5000 INJECTION, SOLUTION INTRAVENOUS at 14:12

## 2021-10-01 RX ADMIN — ALBUTEROL SULFATE 2 PUFF: 90 AEROSOL, METERED RESPIRATORY (INHALATION) at 07:51

## 2021-10-01 RX ADMIN — Medication 1 CAPSULE: at 08:15

## 2021-10-01 RX ADMIN — ALBUTEROL SULFATE 2 PUFF: 90 AEROSOL, METERED RESPIRATORY (INHALATION) at 20:55

## 2021-10-01 RX ADMIN — ALBUTEROL SULFATE 2 PUFF: 90 AEROSOL, METERED RESPIRATORY (INHALATION) at 16:53

## 2021-10-01 RX ADMIN — FAMOTIDINE 20 MG: 20 TABLET, FILM COATED ORAL at 08:15

## 2021-10-01 RX ADMIN — Medication 10 ML: at 21:53

## 2021-10-01 RX ADMIN — IOPAMIDOL 100 ML: 755 INJECTION, SOLUTION INTRAVENOUS at 13:22

## 2021-10-01 RX ADMIN — OXYCODONE HYDROCHLORIDE AND ACETAMINOPHEN 500 MG: 500 TABLET ORAL at 17:14

## 2021-10-01 RX ADMIN — SODIUM CHLORIDE 100 ML: 900 INJECTION, SOLUTION INTRAVENOUS at 13:22

## 2021-10-01 RX ADMIN — OXYCODONE HYDROCHLORIDE AND ACETAMINOPHEN 500 MG: 500 TABLET ORAL at 08:15

## 2021-10-01 RX ADMIN — DEXAMETHASONE 6 MG: 4 TABLET ORAL at 08:15

## 2021-10-01 RX ADMIN — Medication 10 ML: at 13:22

## 2021-10-01 NOTE — PROGRESS NOTES
Physical Therapy Note:    PT attempted treatment of pt this PM, recent imaging confirmed B DVT and PT is to be held at this time. RN states that they are checking for PE. PT will resume as pt is able to be seen.      Thank you,  Maryjo Perez, PT, DPT

## 2021-10-01 NOTE — PROGRESS NOTES
Patient c/o tenderness to BLE. No swelling, redness, or heat noted on BLE. Jose Norwood NP, notified. Orders received for BLE duplex.

## 2021-10-01 NOTE — PROGRESS NOTES
Hospitalist Progress Note   Admit Date:  2021 12:00 PM   Name:  Farheen Jackson   Age:  39 y.o. Sex:  male  :  1980   MRN:  [id number]   Room:  [facility]    Presenting Complaint: Shortness of Breath    Reason(s) for Admission: COVID-19 [U07.1]  Acute respiratory failure [facility]) [J96.00]     Hospital Course & Interval History:   Patient is a 37 y. o. male who presented to the ED for cc worsening SOB after being diagnosed with COVID . Hx of HTN but not on any medications       Subjective (10/01/21): Denies CP but does report new onset bilateral calf pain     Assessment & Plan:     Principal Problem:  Acute Respiratory Failure with COVID-19 Pneumonia (2021)  21 Continue decadron, COVID vits, Inhaler; If O2 requirements increase, will consider Barcitinib; Procal 0.09; will hold off on abx   21 Weaning O2 from 8 to 7L with appropriate sats; monitor and wean as able     Active Problems:  Provoked BLE DVT  10/1/21 Patient with complaint of bilateral leg pain; likely 2/2 immobilization with hospitalization and COVID   -Ddimer > 20; Doppler showing DVT BLE; CTPE pending   -Heparin started;  Patient will need oral AC at discharge    Essential hypertension (2017)  21 BP stable with intervention; Prn antihypertensive; will start antihypertensive if needed      NICOLE (obstructive sleep apnea) (2021)  CPAP ordered qhs      Transaminitis   21 Likely COVID induced; Monitor             Dispo/Discharge Planning:      Dispo pending     Diet:  ADULT DIET Regular; Low Fat/Low Chol/High Fiber/2 gm Na  DVT PPx: Heparin   Code status: Full Code    Hospital Problems as of 10/1/2021 Date Reviewed: 2018        Codes Class Noted - Resolved POA    Acute respiratory failure ([facility] [address] ICD-10-CM: J96.00  ICD-9-CM: 518.81  2021 - Present Unknown        * (Principal) COVID-19 ICD-10-CM: U07.1  ICD-9-CM: 079.89  2021 - Present Unknown        NICOLE (obstructive sleep apnea) ICD-10-CM: G47.33  ICD-9-CM: 327.23  9/28/2021 - Present Unknown        Essential hypertension ICD-10-CM: I10  ICD-9-CM: 401.9  11/27/2017 - Present Yes              Objective:     Patient Vitals for the past 24 hrs:   Temp Pulse Resp BP SpO2   10/01/21 1127 97.9 °F (36.6 °C) 80 20 119/73 90 %   10/01/21 1125     91 %   10/01/21 0751     90 %   10/01/21 0747 98 °F (36.7 °C) 68 21 138/86 93 %   10/01/21 0317 98.2 °F (36.8 °C) 78 19 (!) 138/90 93 %   09/30/21 2253 98.6 °F (37 °C) 68 20 132/83 97 %   09/30/21 2111     90 %   09/30/21 1919 97.5 °F (36.4 °C) 82 20 130/87 94 %   09/30/21 1654 98 °F (36.7 °C) 84 20 137/89 94 %   09/30/21 1351 98.4 °F (36.9 °C) 63 18 (!) 141/84 93 %     Oxygen Therapy  O2 Sat (%): 90 % (10/01/21 1127)  Pulse via Oximetry: 76 beats per minute (10/01/21 0751)  O2 Device: Hi flow nasal cannula (10/01/21 1125)  Skin Assessment: Clean, dry, & intact (10/01/21 0611)  O2 Flow Rate (L/min): 11 l/min (10/01/21 1125)    Estimated body mass index is 32.25 kg/m² as calculated from the following:    Height as of this encounter: 6' 3\" (1.905 m). Weight as of this encounter: 117 kg (258 lb). Intake/Output Summary (Last 24 hours) at 10/1/2021 1319  Last data filed at 10/1/2021 0930  Gross per 24 hour   Intake 1200 ml   Output    Net 1200 ml         Physical Exam:     General:    Well nourished. No overt distress  Head:  Normocephalic, atraumatic  Eyes:  Sclerae appear normal.  Pupils equally round. ENT:  Nares appear normal, no drainage. Moist oral mucosa  Neck:  No restricted ROM. Trachea midline   CV:   RRR. No m/r/g. No jugular venous distension. Lungs:   CTAB. No wheezing, rhonchi, or rales. Respirations even, unlabored  Abdomen: Bowel sounds present. Soft, nontender, nondistended. Extremities: No cyanosis or clubbing. No edema  Skin:     No rashes and normal coloration. Warm and dry. Neuro:  Cranial nerves II-XII grossly intact.    A&Ox3  Psych:  Normal mood and affect. I have reviewed ordered lab tests and independently visualized imaging below:    Last 24hr Labs:  Recent Results (from the past 24 hour(s))   D DIMER    Collection Time: 10/01/21 10:11 AM   Result Value Ref Range    D DIMER >20.00 (H) <0.56 ug/ml(FEU)   CBC WITH AUTOMATED DIFF    Collection Time: 10/01/21 12:00 PM   Result Value Ref Range    WBC 16.5 (H) 4.3 - 11.1 K/uL    RBC 4.81 4.23 - 5.6 M/uL    HGB 15.0 13.6 - 17.2 g/dL    HCT 43.4 41.1 - 50.3 %    MCV 90.2 79.6 - 97.8 FL    MCH 31.2 26.1 - 32.9 PG    MCHC 34.6 31.4 - 35.0 g/dL    RDW 12.0 11.9 - 14.6 %    PLATELET 739 392 - 041 K/uL    MPV 10.4 9.4 - 12.3 FL    ABSOLUTE NRBC 0.00 0.0 - 0.2 K/uL    DF AUTOMATED      NEUTROPHILS 83 (H) 43 - 78 %    LYMPHOCYTES 6 (L) 13 - 44 %    MONOCYTES 7 4.0 - 12.0 %    EOSINOPHILS 0 (L) 0.5 - 7.8 %    BASOPHILS 0 0.0 - 2.0 %    IMMATURE GRANULOCYTES 4 0.0 - 5.0 %    ABS. NEUTROPHILS 13.7 (H) 1.7 - 8.2 K/UL    ABS. LYMPHOCYTES 0.9 0.5 - 4.6 K/UL    ABS. MONOCYTES 1.1 0.1 - 1.3 K/UL    ABS. EOSINOPHILS 0.1 0.0 - 0.8 K/UL    ABS. BASOPHILS 0.1 0.0 - 0.2 K/UL    ABS. IMM.  GRANS. 0.7 (H) 0.0 - 0.5 K/UL   METABOLIC PANEL, BASIC    Collection Time: 10/01/21 12:00 PM   Result Value Ref Range    Sodium 136 136 - 145 mmol/L    Potassium 4.6 3.5 - 5.1 mmol/L    Chloride 100 98 - 107 mmol/L    CO2 26 21 - 32 mmol/L    Anion gap 10 7 - 16 mmol/L    Glucose 106 (H) 65 - 100 mg/dL    BUN 23 6 - 23 MG/DL    Creatinine 0.88 0.8 - 1.5 MG/DL    GFR est AA >60 >60 ml/min/1.73m2    GFR est non-AA >60 >60 ml/min/1.73m2    Calcium 9.1 8.3 - 10.4 MG/DL       All Micro Results     Procedure Component Value Units Date/Time    COVID-19 RAPID TEST [[id number]]  (Abnormal) Collected: 09/28/21 1341    Order Status: Completed Specimen: Nasopharyngeal Updated: 09/28/21 1351     Specimen source NASAL        COVID-19 rapid test Detected        Comment:      The specimen is POSITIVE for SARS-CoV-2, the novel coronavirus associated with COVID-19. This test has been authorized by the FDA under an Emergency Use Authorization (EUA) for use by authorized laboratories. Fact sheet for Healthcare Providers: ConventionUpdate.co.nz  Fact sheet for Patients: ConventionUpdate.co.nz       Methodology: Isothermal Nucleic Acid Amplification               Other Studies:  DUPLEX LOWER EXT VENOUS BILAT    Result Date: 10/1/2021  RIGHT LOWER EXTREMITY VENOUS DUPLEX ULTRASOUND. INDICATION: Bilateral calf pain. TECHNIQUE: Direct skin-contact high resolution grayscale images, color-flow Doppler and duplex waveform analysis. FINDINGS: There is compressibility, color-flow assignment and augmentation of the venous waveform with calf compression in the common femoral, superficial femoral and popliteal veins. Upper calf veins appear enlarged with abnormal intraluminal echoes in the posterior tibial and peroneal veins. Positive for deep venous thrombosis right lower extremity calf veins. LEFT LOWER EXTREMITY VENOUS DUPLEX ULTRASOUND. INDICATION: Left calf pain. TECHNIQUE: Direct skin-contact high resolution grayscale images, color-flow Doppler and duplex waveform analysis. FINDINGS: There is compressibility, color-flow assignment and augmentation of the venous waveform with calf compression in the common femoral, superficial femoral and popliteal veins. Upper calf veins demonstrate abnormal intraluminal echoes within the left posterior tibial veins. IMPRESSION: Positive for sonographic evidence of deep venous thrombosis left lower extremity calf vein.        Current Meds:  Current Facility-Administered Medications   Medication Dose Route Frequency    sodium chloride 0.9 % bolus infusion 100 mL  100 mL IntraVENous RAD ONCE    saline peripheral flush soln 10 mL  10 mL InterCATHeter RAD ONCE    iopamidoL (ISOVUE-370) 76 % injection 100 mL  100 mL IntraVENous RAD ONCE    heparin (porcine) injection 9,350 Units  80 Units/kg IntraVENous ONCE    heparin 25,000 units in dextrose 500 mL infusion  18-36 Units/kg/hr IntraVENous TITRATE    dexAMETHasone (DECADRON) tablet 6 mg  6 mg Oral DAILY    famotidine (PEPCID) tablet 20 mg  20 mg Oral BID    alum-mag hydroxide-simeth (MYLANTA) oral suspension 30 mL  30 mL Oral Q4H PRN    sodium chloride (NS) flush 5-40 mL  5-40 mL IntraVENous Q8H    sodium chloride (NS) flush 5-40 mL  5-40 mL IntraVENous PRN    acetaminophen (TYLENOL) tablet 650 mg  650 mg Oral Q6H PRN    Or    acetaminophen (TYLENOL) suppository 650 mg  650 mg Rectal Q6H PRN    polyethylene glycol (MIRALAX) packet 17 g  17 g Oral DAILY PRN    ondansetron (ZOFRAN ODT) tablet 4 mg  4 mg Oral Q8H PRN    Or    ondansetron (ZOFRAN) injection 4 mg  4 mg IntraVENous Q6H PRN    ascorbic acid (vitamin C) (VITAMIN C) tablet 500 mg  500 mg Oral BID    albuterol (PROVENTIL HFA, VENTOLIN HFA, PROAIR HFA) inhaler 2 Puff  2 Puff Inhalation Q6H RT    HYDROcodone-homatropine (HYCODAN) 5-1.5 mg/5 mL (5 mL) oral solution 5 mL  5 mL Oral Q4H PRN    loperamide (IMODIUM) capsule 2 mg  2 mg Oral Q4H PRN    hydrALAZINE (APRESOLINE) 20 mg/mL injection 20 mg  20 mg IntraVENous Q6H PRN    zinc sulfate (ZINCATE) 50 mg zinc (220 mg) capsule 1 Capsule  1 Capsule Oral DAILY    hydrocortisone (ANUSOL-HC) 2.5 % rectal cream   PeriANAL QID PRN    diphenhydrAMINE (BENADRYL) capsule 50 mg  50 mg Oral QHS PRN       Signed:  Brandon Enrique NP    Part of this note may have been written by using a voice dictation software. The note has been proof read but may still contain some grammatical/other typographical errors.

## 2021-10-01 NOTE — PROGRESS NOTES
Physical Therapy Note:    Pt treatment attempted, he was recently up and walking in the room and is complaining of calf pain.  RN notified of continued complaints of calf pain and pt delayed until PM to allow for pt to rest.     Thank you,    Minerva Osborne, PT, DPT

## 2021-10-01 NOTE — PROGRESS NOTES
Patient awake resting in bed. Respirations present. On 11 L HF NC. No signs of distress. AxO x4. No needs expressed. Bed low and locked. Call light within reach. Preparing to give report to oncoming RN.

## 2021-10-01 NOTE — PROGRESS NOTES
Problem: Airway Clearance - Ineffective  Goal: Achieve or maintain patent airway  Outcome: Progressing Towards Goal     Problem: Gas Exchange - Impaired  Goal: Absence of hypoxia  Outcome: Progressing Towards Goal     Problem: Gas Exchange - Impaired  Goal: Promote optimal lung function  Outcome: Progressing Towards Goal

## 2021-10-01 NOTE — RT PROTOCOL NOTE
Respiratory Care Services     Policy Number: [id number]    Title: Oxygen Protocol    Effective Date: 01/1996    Revised Date: 06/2013, 02/29/2016, 4/2018, 7/2019    Reviewed Date: 05/2014, 03/2015, 06/2017, 11/2020        I. Policy: The Oxygen Protocol will be initiated for all patients upon written order from physician for administration of oxygen therapy or if a patient is found to have an oxygen saturation of 88% or less. Special consideration: the goal of oxygen therapy for COPD patients is to maintain oxygen saturation between 88% - 92% to comply with GOLD Guidelines. II. Purpose: To provide protocol driven respiratory therapy for the administration of oxygen at concentrations greater than that in ambient air with the intent of treating or preventing the symptoms and manifestations of hypoxia. III. Responsibility: Director Respiratory Care Services, all Respiratory Care Practitioners     IV. Indications:   A. Implement this protocol for patients when physician orders oxygen to be administered or when patient is found to have an oxygen saturation of 88% or less. B. To assure routine monitoring of patient's oxygen saturation b.i.d. and to make appropriate adjustments in accordance with ordered oxygen saturation parameters. C. To assure continuity of respiratory care that meets [facility] Clinical Practice Guidelines and GOLD Guidelines. D. Hb < 8  E. Sickle Cell anemia crisis    V. Assessment:  Assess the following parameters to determine the need to adjust oxygen:  A. Measurement of patient's oxygen saturation via pulse oximetry. B. Observation of patient's color, respiratory effort, and responsiveness. C. Measurement of heart rate and respiratory rate. D. Complete a three-step ambulatory oxygen saturation when ordered. VI. Initiation:  Upon receipt of an order for oxygen, the RCP will:   A.  Verify order in the patient's EMR, which should include the desired oxygen saturation to be maintained. B. The patient shall be placed on oxygen with humidity (except for those oxygen delivery devices that do not require humidity, i.e. venturi masks and non-rebreather masks) as ordered by the physician to achieve the prescribed oxygen saturation. C. In the event that no saturation is specified, a saturation of 90% will be maintained. D. Patients, who are found to have a SaO2 of 88% or less, may be started on supplemental oxygen as described above. E. Patients admitted with cardiac problems/disease shall be maintained at 92% per Chest Committee recommendation. F. The patient will be informed of the \"no smoking policy\" and instructed in the proper use of oxygen therapy. G. Once desired oxygen saturation has been achieved, the RCP will document FIO2 and oxygen saturation in the respiratory section of the patient's EMR. VII. Maintenance:   A. 30-second oxygen saturation check will be taken to maintain the saturation ordered by the physician each day. B. Patients will be assessed each shift and as needed by pulse oximetry to determine if oxygen needs to be decreased, increased or discontinued. C. If changes in FIO2 are indicated, all changes will be documented in the respiratory section of the patient's EMR. D. If no changes in FIO2 are required, the patient's oxygen flow rate and saturation will be recorded in the respiratory section of the patient's EMR. E. Per [facility], patients who are receiving oxygen therapy but are not on oxygen at home, should be weaned off oxygen as soon as possible or when anticipated discharge becomes evident. Shan Ramal will be discontinued after oxygen saturation has been maintained for 24 hours on room air and documented in the patient's EMR. G. Patients on the Inpatient Rehabilitation area on 9th floor will be exempt from having their oxygen discontinued per protocol.  Oxygen may be weaned but will be changed to prn to meet the needs of the patient when exercising and participating in therapy. H. The goal of oxygen therapy is to maintain patients with a diagnosis of COPD at oxygen saturation between 88% - 92% to comply with GOLD Guidelines. VIII. Safety: RCP will address the following safety issues:  A. Identify patient using the two patient identifiers name and birth date via ID bracelet. B. Perform hand hygiene per hospital policy utilizing Standard Precautions for all patients and following transmission-based isolation as indicated per hospital policy. C. Cardiac patients will be maintained at an oxygen saturation of 92%. D. If a patient's FIO2 requirements necessitate changing oxygen delivery devices to a high concentration of oxygen, documentation indicating the change must be included in the progress notes, as well as in the respiratory flowsheet. E. If a patient has a hemoglobin level <8 mg. RCP will consult physician before discontinuing oxygen. IX. Interventions:   A. RCP will assess patient for signs of respiratory distress or suspicion of CO2 retention. B. An ABG may be obtained for patients exhibiting respiratory distress or sickle cell crisis. C. An order should be entered into patient's EMR for ABG under per protocol. X. Documentation  A. Document assessment findings in the respiratory section of the patient's EMR. B. Document changes in therapy per protocol in the respiratory orders section and in the care plan section of the patient's EMR. C. Document patient education in the patient education section of the patient's EMR. XI. Reportable Conditions:  Report to the physician immediately:  A. Acute changes in patient's respiratory status. B. An oxygen saturation <85%. C. A change in oxygen delivery device to provide a high concentration of oxygen. XII. Patient Instructions: Review with Patient  A. Purpose of oxygen therapy  B. Proper technique for using oxygen  C.  No smoking policy    Approval: Pulmonary Committee (1-25-96)  Revision: Chest Committee (4-28-05)    L - Respiratory Care Department Policy, Procedure and Protocol Guideline Manual, 1995,         PERRI Viveros. L - Therapist Driven Respiratory Care Protocols  A Practitioner's Guide for Criteria-Based       Respiratory Care by Brenna Baumgarten, M.D., and PERRI Calhoun, JACKY. L - The rationale for therapist-driven protocols: an update. Respiratory Care 1998. N  [facility] Clinical Practice Guidelines. Respiratory Care Services       Policy Number: [id number]    Title: Patient Care Assessment Protocol    Effective Date: 01/1999    Revised Date: 05/2014, 04/2018, 12/2018, 07/2019    Reviewed Date: 06/2013/ 03/2015, 03/2016, 06/2017, 11/2020        Overview  In an effort to improve quality and reduce costs of respiratory care at [facility], the Respiratory Department has developed a number of Patient Care Protocols. These protocols have been developed according to Alejandra 3 and are utilized for those patients who are ordered respiratory therapy using therapeutic indications and standardized approaches for accomplishing objectives. Patient Care Protocols are intended to improve care by:   Defining the indications and standards of care agreed upon by the Pulmonary Medicine and [address] [place] [address] of [facility].  Training respiratory care practitioners to apply those criteria to individual patients and modify therapy as indicated by the protocols.  Documenting the indication and care plan as part of the initial ordering process.  Tapering or discontinuing treatments once the indication for therapy changes. The Patient Care Protocols shall be universally applied throughout the hospital as determined by   the Pulmonary Medicine and [address] [place] [address] Ave.     Rationale for Patient Care Assessment Protocols:   Continuous Quality Improvement   Cost containment   Standardization of care   Enhanced continuity of care   Utilization review   Timely intervention    The following patient care assessment protocols have been developed:   Aerosolized Medication Protocol    Bronchial Hygiene Protocol    Oxygen Protocol   CVRU Fast Track Weaning Protocol    Asthma Treatment Protocol ER   Pediatric Asthma Treatment Protocol ER   Alpha-1 Antitrypsin Deficiency Protocol   Prone Positioning Protocol    COPD Protocol    Home Oxygen Assessment Protocol   Ventilator Weaning Protocol    Lung Volume Expansion Protocol    The Director of Fadumo Rodriguez oversees the Patient Care Assessment Program. The Respiratory Educator is responsible for protocol development and training. The Supervisor is responsible for implementation and  activities. Each patient with an order for respiratory treatments will receive an evaluation. Respiratory Care Practitioners (RCP's) will perform the evaluations. The same evaluation tool will be utilized for initial and follow-up assessments. If the patient does not meet criteria for ordered therapy, the therapy will be discontinued. If the patient demonstrates an adverse response to initially ordered therapy, the therapy will be discontinued and the physician will be contacted. Specific physician's orders that deviate from protocols and are deemed \"inappropriate\" or \"unsafe\" will be addressed with ordering physician and/or medical director as required. Respiratory Patient Care Assessment Protocols    I. Policy:   In an effort to provide quality patient care and effective utilization of services, physicians who order respiratory therapy will have their patients treated via the protocols established (see attached) Respiratory Care Practitioners (RCP's) will complete the initial assessment which will indicate patient needs,  the care plan developed and will performed within 24 hours of admission. Frequency of the therapy will be set according to the results of the respiratory therapy evaluation and frequency guidelines policy. Reassessment will be continued every 48 hours and more frequently as needed for the individual patient. II. Purpose:     F. To provide a process that will allow for ongoing assessment and care plan modification for patients receiving respiratory services based on both objective and or subjective patient responses to interventions. This process of protocol utilization will assist in patient care progression while eliminating the need for the physician to continually update respiratory therapy orders. G. To assure continuity of respiratory care that meets [facility] Clinical Practice Guidelines. III. Initiation:  Implement Respiratory Care Protocols for patients who are ordered by physician          to receive respiratory therapy procedures or for ventilator management. IV. Protocol:  E. Upon receiving an order for therapy the RCP will review the patient's EMR (electronic medical record) for all pertinent information includin. Physician's order for therapy  2. Patient history and physical examination  3. Physician progress notes  4. Diagnostic. X-rays, PFT's, arterial blood gases etc.  F. The RCP will perform a respiratory assessment in the following manner:  1. General observations: color, pattern and effort of breathing, chest expansion, (symmetrical and bilateral), level of consciousness and the ability to ambulate. 2. The RCP will assess patient's cough ability and determine if bronchial hygiene is needed. If patient is unable to produce sputum, at that time, the RCP should question the patient with regard to their sputum: production, color consistency, frequency and amount.   3. Auscultation: Using a stethoscope, the RCP will listen and note quality of breath sounds and presence or absence of adventitious breath sounds in all lung fields, both anteriorly and posteriorly. 4. Upon completing the EMR review and physical assessment, the RCP will document findings in the RT Assessment section of the EMR. The score level will be provided and will be used to determine the frequency of therapy. V. Indications:   A. Bronchial Hygiene Protocol indications:   1. Potential for or presence of atelectasis. 1. Need for hydration and removal of retained secretions. 1. Need for improvement of cough effectiveness. 1. Presence of conditions associated with disorder of pulmonary clearance:  a. Cystic fibrosis  b. Bronchiectasis  c. Neuromuscular disease  d. Obstructive lung diseases  e. Restrictive lung diseases   Aerosolized Medication(s) Protocol indications:Treatment of bronchospasm/wheezing  2. Improvement of mucociliary clearance  3. Treatment of stridor  4. History of Bronchiectasis, Asthma or COPD  C. Oxygen Therapy Protocol indications:   1. Documented hypoxemia  2. Severe trauma  3. Acute myocardial infarction  4. Short-term therapy (e.g. post anesthesia recovery)  D. CVRU Ventilator Weaning Protocol indications:  1. All mechanically ventilated surgical patients unless they have a no wean order. E. Asthma Treatment Protocol ER indications:  1. Patients 15years of age and older that have been triaged or diagnosed with   asthma exacerbation shall be indicated for the ER Asthma Treatment Protocol. A physician order will be required to initiate the protocol. F. Pediatric Asthma protocol in the ER indications:  1. Patients less than 15years old that have been triaged or diagnosed with asthma exacerbation shall be indicated for the Pediatric Asthma protocol. A physician order will be required to initiate the protocol. G. Alpha-1 Antitrypsin Deficiency Testing protocol indications:         1. Patients admitted and diagnosed with COPD. H. Prone Positioning Protocol indications:         1. Acute lung injury         2. Acute respiratory distress syndrome (ARDS)   I. Respiratory Care COPD Protocol indications:         1. History of COPD in patient's records         2. Smoking history   J. Home Oxygen Assessment Protocol indications:         1. Chronic lung disease         2. Cor pulmonale         3. Unable to wean to room air 48 hours prior to anticipated discharge. K.  Ventilator Weaning Protocol indications:         1. Patient's mechanically ventilated          2. Managed by intensivist  L. Lung Volume Expansion Protocol indications:        1. Any patient at risk for pulmonary complications. VI. Maintenance:    H. Timely patient assessment is an integral part of this protocol therefore the following will be applied:  1. All non- critical care patients will be evaluated upon receiving initial respiratory care orders within 24 hours and re-evaluated within 48 hours (or more as needed). 2. Orders requesting a Respiratory Consult will be responded to in the following manner:  a. In patient emergency situations, the RCP assigned to the floor will respond immediately to the patient, provide an initial respiratory assessment, and contact the patient's physician as necessary for appropriate orders. b. In non-emergent situations, the RCP assigned to the floor will respond to the patient within 90 minutes and provide an initial respiratory assessment and contact patient's physician as necessary for appropriate orders. c. An RCP will provide a comprehensive assessment as soon as possible. 3. Upon completion of an evaluation, the RCP will complete documentation in the patient's EMR in the RT Assessment section. 4. The RCP who completes the assessment will document orders for therapy in the orders section of the patient's EMR selecting new order. Next, per protocol should be selected indicating it is a protocol order and sign orders should be selected to complete the process.  The applicable protocol must be added to the progress note per Joint Commission guidelines. 5. The Pharmacy and Therapeutics (P&T) Committee has mandated that the medication Xopenex may be changed to unit dose albuterol without an order, except for those patients receiving Xopenex due to cardiac arrhythmias. 1. The dosage for these patients should be 0.63 mg. and may be changed from 1.25 mg. to 0.63 mg per P & T Committee by the RCP completing the assessment. 6. Patients who are not experiencing cardiac arrhythmias, and are ordered Xopenex and Atrovent may be changed to Duoneb. VII. Safety:        I. The following safety issues shall be monitored:  1. The RCP will perform hand hygiene per hospital policy utilizing Standard Precautions for all patients and following transmission-based isolation as indicated per hospital policy. 2. The RCP must exercise professional judgment in classifying the patient for frequency of therapy. 3. Appropriate classification of the patient will require an evaluation utilizing the Therapy Assessment Protocol Guidelines. 4. The RCP will confer with the physician concerning the care of the patient at any time questions or problems arise. 5. If during therapy, the patient exhibits no improvement, or deterioration in clinical status the RCP will notify the physician and the patient's nurse. VIII. Interventions:   F. The patient's nurse is responsible concerning all items related to his/her care. Ongoing communication with nursing is essential to successful protocol management. G. The RCP recognizes the value of the team approach in meeting the patient's needs. Nursing input regarding the patient's pulmonary condition will be sought as needed. IX. Reportable conditions: The RCP will inform the physician if:  1. There are acute changes in patient's respiratory status. 2. The therapist is unable to determine appropriate care plan upon assessment. 3. The patient fails to reach therapeutic objective.   4. A change or additional medication is needed. X.  Patient Education:    D. Patient will receive instruction on the followin. The treatment modality, including objectives and proper technique of therapy  2. Respiratory medications  E. Documentation shall occur in the patient education section of the patient's EMR. XI. Documentation: Record all findings as described above in the patient's EMR. Related Protocols: A. Aerosolized Medication Protocol  B. Bronchial Hygiene   C. Oxygen Protocol   D. RUST Fast Track Weaning Protocol  E. Asthma Treatment protocol ER  F. Alpha-1 antitrypsin Deficiency Protocol  G. Prone Positioning Protocol  H. Respiratory Care COPD Protocol  I. Home Oxygen assessment Protocol  J. Ventilator Weaning Protocols   K. Volume Expansion protocol    Indications      Frequency          Level  A. Aerosol therapy   1.  q4h     Severe SOB, wheezing, unable to sleep 1   2.  qid, q4 wa or q6h   Moderate SOB, wheezing   2   3.  tid      Hx of asthma, or COPD mild wheezing,         or facilitate secretion removal              3   4.  bid      Asthma, or COPD, Intermittent wheezing 4   5. PRN, i.e. tid PRN, qid PRN Asthma, or COPD, occasional wheezing 5       B. Bronchopulmonary Hygiene    1. q4h             Copious secretions, SOB, unable to sleep 1   2. qid & PRN            Moderate amounts of secretions   2   3. tid           Small amounts of secretions and poor cough,               history of secretions    3    4. PRN, i.e. tid PRN, qid PRN     Breathing exercises, encourage cough only 4      C. Oxygen Therapy     Follow hospital approved Oxygen Protocol      Note:  qid treatments are due 0800, 1200, 1600, and 2000. tid treatments are due 0800, 1400, and 2000  Q6h treatments are due 0800, 1400, 2000, 0200  Q4 wa teatments are due 0800, 1200, 1600, and 2000. Q4h treatments are due  0800, 1200, 1600, 2000, 0000, and 0400.         The Level 1-5 rating system is only to be used as criteria for determining appropriate frequency of therapy. References:   N   Joint Commission Consolidated Niall Standard   L    Respiratory Care Department Policy, Procedure and Protocol Guideline Manual, 1995, PERRI MARTINEZ  Therapist Driven Respiratory Care Protocols  A Practitioner's Guide for Criteria-Based Respiratory Care by Matilde Ahumada M.D., and RINA Patel  The rationale for therapist-driven protocols: an update. Respiratory Care 1998; 43:244-237   L Therapist Driven Respiratory Care Protocols  A Practitioner's Guide for Criteria-Based Respiratory Care by Matilde Ahumada M.D., and RINA Patel The rationale for therapist-driven protocols: an update. Respiratory Care 1998; [id number]. N   [facility] Clinical Practice Guidelines. D. The RCP will perform a respiratory assessment in the following manner:  1. Perform hand hygiene per hospital policy utilizing Standard Precautions for all patients and following transmission-based isolation as indicated per policy. 2. Identify patient via ID bracelet verifying patient name and birth date. 3. General observations: color, pattern and effort of breathing, chest expansion, (symmetrical and bilateral), level of consciousness and the ability to ambulate. 4. The RCP will assess patients cough ability and determine if Nasotracheal suctioning is needed. If patient is unable to produce sputum, at that time, the RCP should question the patient with regard to their sputum: production, color consistency, frequency and amount. 5. Auscultation: Using a stethoscope, the RCP will listen and note quality of breath sounds and presence or absence of adventitious breath sounds in all lung fields, both anteriorly and posteriorly. 6. Upon completing the EMR review and physical assessment, the RCP will document findings in the RT Assessment section of the EMR. The score level will be provided and will be used to determine the frequency of therapy. V. Indications:   A.   Indications for Bronchial Hygiene Protocol will include:  1. Potential for or presence of atelectasis. 2. Need for hydration and removal of retained secretions. 3. Need for improvement of cough effectiveness. 4. Presence of conditions associated with disorder of pulmonary clearance:  a. Cystic fibrosis  b. Bronchiectasis  B. Indications for Aerosolized Medication(s) Protocol should include:  1. Treatment of bronchospasm/wheezing  2. Improvement of mucociliary clearance  3. Treatment of stridor  4. History of Asthma or COPD             C.  Indications for Oxygen Therapy Protocol should include:  1. Documented hypoxemia  2. Severe trauma  3. Acute myocardial infarction  4. Short-term therapy (e.g. post anesthesia recovery)    VI. Maintenance:    D. Timely patient assessment is an integral part of this protocol therefore the following will be applied:  1. All non- critical care patients will be evaluated upon receiving initial respiratory care orders within 24 hours and re-evaluated within 48 hours (or more as needed). 2. Orders requesting a Respiratory Consult will be responded to in the following manner:  a. In patient emergency situations, the RCP assigned to the floor will respond immediately to the patient, provide an initial respiratory assessment, and contact the patients physician as necessary for appropriate orders. b. In non-emergent situations, the RCP assigned to the floor will respond to the patient within 90 minutes and provide an initial respiratory assessment and contact patients physician as necessary for appropriate orders. c. An RCP will provide a comprehensive assessment as soon as possible. 3. Upon completion of an evaluation, the RCP will complete documentation in the patients EMR in the RT Assessment section. 4. The RCP who completes the assessment will document orders for therapy in the orders section of the patients EMR selecting new order.  Next, per protocol should be selected indicating it is a protocol order and sign orders should be selected to complete the process. 5. The Pharmacy and Therapeutics (P&T) Committee has mandated that the medication Xopenex may be changed to unit dose albuterol without an order, except for those patients receiving Xopenex due to cardiac arrhythmias. The dosage for these patients should be 0.63 mg. and may be changed from 1.25 mg. to 0.63 mg per P & T Committee by the RCP completing the assessment. 6. Patients who are not experiencing cardiac arrhythmias, and are ordered Xopenex and Atrovent may be changed to Duoneb. VII. Safety:        A. The following safety issues shall be monitored:  1. The RCP will perform hand hygiene per hospital policy utilizing Standard Precautions for all patients and following transmission-based isolation as indicated per hospital policy. 2. The RCP must exercise professional judgment in classifying the patient for frequency of therapy. 3. Appropriate classification of the patient will require an evaluation utilizing the Therapy Assessment Protocol Guidelines. 4. The RCP will confer with the physician concerning the care of the patient at any time questions or problems arise. 5. If during therapy, the patient exhibits no improvement or deterioration in clinical status the RCP will notify the physician and the patients nurse. VIII. Interventions:   A. The patients nurse is responsible concerning all items related to his/her care. Ongoing communication with nursing is essential to successful protocol management. B. The RCP recognizes the value of the team approach in meeting the patients needs. Nursing input regarding the patients pulmonary condition will be sought as needed. IX. Reportable conditions: The RCP will inform the physician if:  1. There are acute changes in patients respiratory status. 2. The therapist is unable to determine appropriate care plan upon assessment.   3. The patient fails to reach therapeutic objective. 4. A change or additional medication is needed. X.  Patient Education:    A. Patient will receive instruction on the followin. The treatment modality, including objectives and proper technique of therapy  2. Respiratory medications  B. Documentation shall occur in the patient education section of the patients EMR. XI. Documentation: Record all findings as described above in the patients EMR. Related Protocols: A. Aerosolized Medication Protocol  B. Bronchial Hygiene  C. Oxygen Protocol   D. Volume Expansion/Secretion Clearance  E. Ventilator Weaning Protocols    References:  N   Joint Commission Consolidated [place]l Standard   L    Respiratory Care Department Policy, Procedure and Protocol Guideline Manual, , PERRI Viveros. L  Therapist Driven Respiratory Care Protocols  A Practitioners Guide for Criteria-Based Respiratory Care by Jacob Garcia M.D., and PERRI Wynn RRT. L  The rationale for therapist-driven protocols: an update. Respiratory Care 1998; [facility] Guidelines. Respiratory Care Services Policy Number: [id number]    Title: Aerosolized Medication Protocol    Effective Date: 10/1998    Revised Date: , , ,      Reviewed Date: / 03/15 , , , 2020   I. Policy: The Aerosolized Medication Protocol shall by implemented by Respiratory Care Practitioners (RCP) for patients with orders to receive aerosol therapy with medication. II. Purpose: To open and maintain obstructed airways, the RCP, will utilize the following   protocol to select the indicated aerosolized medication(s) and determine the most effective method of delivery to the patient. III. Patient Type: All patients who are determined to meet aerosolized medication criteria as          outlined in this protocol. IV.  Responsibility: Director, [address], registered Respiratory Care Practitioners (RCP's) with documented competency in the performance of                                     respiratory therapeutic techniques. V. Equipment needed:  H. Stethoscope  I. Pulse oximeter  J. AeroEclipse nebulizer  K. Meter Dose Inhaler (MDI)    VI. Protocol:   G. The following conditions are accepted indications for aerosolized medication therapy. 5. Bronchospasm/wheezing  6. Impaired mucociliary clearance  7. Tracheobronchial mucosal congestion/and laryngeal stridor  8. Diseases which commonly require aerosolized medication therapy include, but are not limited to:  f. Asthma/reactive airway disease  g. Bronchitis/emphysema (COPD)  h. Cystic fibrosis  i. Severe laryngitis/tracheitis  j. Bronchiectasis  k. Smoke inhalation or chemical trauma to the lung or upper airway  l. Physical trauma to the upper airway  m. Laryngotracheobronchitis  n. Bronchiolitis  o. Non-specific wheezing              B. Indications for bronchodilator medications will include:  d. Bronchospasm/ wheezing  e. Asthma/reactive airway disease  f. Chronic obstructive pulmonary disease  g. Obstructive defect on pulmonary function testing  C. Administration of medications  5. If a bronchodilator or any other type of respiratory medication is needed, a physician order must be indicated in the medication section in the patient's EMR. 6. When the physician specifies the medication and dosage at the time of request, the ordered medication will be used as part of the care plan. D. The following guidelines will be utilized in the evaluation and selection of the appropriate delivery device for indicated medication(s):  1. MDI is the preferred method of medication delivery via common canister. a. Patient should be alert/cooperative  b. Able to perform 3 second breath hold. c. Patient may be changed to self-administer as appropriate. d. Patients in isolation will be provided an individual inhaler.   2. Unassisted aerosol (UA) is indicated if  c. Ventilation is inadequate  d. Patient is unable to perform MDI appropriately     VII. Guidelines:   Monitor patient's vital signs and evaluate patient's clinical status. The need to change medication and/or modality may be indicated by:  5. A pulse greater than 120 bpm, or if a pulse increase of 20 bpm occurs with bronchodilator medications. 6. Significant worsening of dyspnea or wheezing occurring during or within 30 minutes of discontinuing therapy. 7. Worsening of patient's sensorium (e.g. patient becomes confused or obtunded, and unable to follow directions). 8. Worsening of patient's chest x-ray. 9. Change in sputum (e.g. increased pulmonary infiltrate, which might indicate need for volume expansion therapy). 10. Patient has difficulty coughing up secretions, which might indicate need for acetylcysteine and/or bronchial hygiene therapy. 11. Call physician immediately if dyspnea worsens and is not responsive to modifications allowed by protocol. VIII. Clinical Responsibility:  I. The therapy assessment guidelines will be used to evaluate all patients receiving aerosolized medications with the exception of critical care areas. 2. RCP's will perform changes in therapy per protocol. 2. It will be the responsibility of RCP to provide instruction regarding respiratory medications, possible side effects, aerosol therapy and proper MDI technique, as well as, spacer usage to patients ordered MDI therapy. 2. Current therapy that is part of a patient's home regimen will not be discontinued. a. Provide spacer and educate patient on proper inhaler technique if needed. IX. Documentation  D. Document assessment findings in the respiratory assessment section of the patient's EMR. E. Document changes in therapy per protocol in the respiratory orders section and in the care plan section of the patient's EMR. F. Document patient education in the patient education section of the patient's EMR.   X. Outcome Criteria:  J. Relief of wheezes and obstruction  K. Improved cough and sputum color and consistency  L. Improved chest x-ray  M. Improved arterial oxygen tension and or SaO2  N. Improved Peak Flow on asthmatic patients        XI. Related Policy/Protocols:  H. Respiratory Patient Care Protocols  I. Bronchial Hygiene Therapy  J. Oxygen Protocol  K. [facility] Administration of Metered Dose Inhalers via a Common Canister  L. [address] [place] [address] Generating Procedures  Reference:  L - Respiratory Care Department Policy, Procedure and Protocol Guideline Manual, 1995, PERRI Viveros. L -  Therapist Driven Respiratory Care Protocols  A Practitioner's Guide for Criteria-Based Respiratory Care by Rachel Gan M.D., and PERRI Whittaker, JACKY. L - The rationale for therapist-driven protocols: an update. Respiratory Care 1998; [id number]. N -[facility] Clinical Practice Guidelines. Respiratory Care Services     Policy Number: [id number]    Title: Bronchial Hygiene Protocol    Effective Date: 01/1999    Revised Date: 12/2014, 11/2017, 7/2019    Reviewed Date: 06/2013, 05/2014, 03/2015, 03/2016, 06/2017, 4/2018, 11/2020     I. Purpose: The Respiratory Care Practitioner (RCP) will utilize the following protocol to select and initiate bronchial hygiene therapy to open and maintain obstructed airways when indicated. II. Patients: All patients who are ordered bronchial hygiene therapy. III. Clinical Area: All general patient floors     IV. Protocol: The following conditions or diseases are indications for bronchial hygiene                           therapy. L. Oscillating PEP Therapy        Indications should include:   9. Atelectasis caused by mucus plugging or foreign body  10. Chronic mucociliary clearance disorders  11. Retained secretions which may be associated with the following conditions:  p. Bronchitis  q. Bronchiectasis  r. Pneumonia  M.  PAP- Positive airway pressure therapy  Indications include:  7. Patients with post-operative atelectasis or to prevent post operative atelectasis. 8. Patients who cannot perform deep breathing exercises due to pain. 9. Patients requiring lung expansion therapy who cannot follow instructions. 10. Patients requiring lung expansion therapy with poor inspiratory capacity <10cc/kg. 11. Patients requiring aerosol therapy in conjunction with opening their airways. N. Vibratory / Acoustical Airway Clearance Therapy (ACT)- i.e. (Vibralung, Vest, or Percussor)  Indications should include  12. Patient conditions  that involve retained secretions, increased mucus production and defective mucociliary clearance such as:  h. Cystic fibrosis  i. Chronic bronchitis  j. Bronchiectasis  k. Pneumonia  l. Asthma  m. Muscular dystrophy  n. Post-operative atelectasis  o. Neuromuscular respiratory impairments  p. ACT may be considered in patients with COPD with  symptomatic secretion retention, guided by patient preference,  toleration, and effectiveness of therapy [place] Lan et al., 2013). O. Nasotracheal suctioning indications should include:  5. Inability to cough effectively  6. Excessive secretions  7. Artificial airway      V. Equipment:   A. PEP therapy device   B. Vest therapy equipment   Robert Sherry   D. AccuPap   Alessandra Prompton NT suction equipment       VI. Guidelines:   Monitor patient's vital signs and evaluate patient's clinical status. The need to                                change therapy modality may be indicated by:  Lenny Suarez in patient's sensorium (patient now confused or obtunded, and unable to follow directions). I. A significant deterioration is evident on patient's chest radiograph or increased sputum production. J. Increased thickening of secretions (e.g. mucolytic therapy may be indicated.)  K. Development of wheezing  L. Decrease in oxygen saturation  M. Development of chest pain. VII.    Clinical Responsibility: The Therapy Assessment Protocol guidelines will be used to                re-evaluate all patients on bronchial hygiene therapy (See Therapy Assessment Protocol). J. RCP's will perform changes in therapy according to protocol. Henry Carroll. Bronchial hygiene therapy may be discontinued when goals of therapy are met, e.g., secretions easily expectorated for 48 hours, atelectasis is resolved, etc.  L. PAP Therapy may be utilized in place of IPPB therapy per discretion of the RCP, as approved by the Pulmonary Medicine and [address] [place] [address]. VIII. Outcome Criteria:  Outcome criteria for bronchial hygiene therapy should include:  O. Decrease in sputum production  P. Improved breath sounds  Q. Improved arterial oxygen tension and/or SaO2  R. Improved chest X-ray  S. Subjective response to therapy    IX. Documentation  G. Document assessment findings in the respiratory assessment section of the patient's EMR. H. Document changes in therapy per protocol in the respiratory orders section and in the care plan section of the patient's EMR. I. Document patient education in the patient education section of the patient's EMR. X. Related Protocols:  3. Respiratory Patient Care Assessment Protocols  3. Aerosolized Medication Protocol  3. Oxygen Therapy Protocol        Reference:    L - Respiratory Care Department Policy, Procedure and Protocol Guideline Manual, 1995, PERRI Viveros. L - Therapist Driven Respiratory Care Protocols  A Practitioner's Guide for Criteria-Based         Respiratory Care by Harpal Gaines M.D., and PERRI Salas RRT. N  [facility] Clinical Practice Guidelines. Yancy Street., Claire Gold., Ayde Rueda., Todd Closs., Maranda Interiano., . . . ERICKSON Romo (2013, December). [facility] Clinical Practice Guideline: Effectiveness of Nonpharmacologic Airway Clearance Therapies in Hospitalized Patients. Respiratory Care, 58(12), 5686-2659.  Retrieved June 28, 2019          Respiratory Care Services     Policy Number: [id number]    Title: Noninvasive Positive Pressure Ventilation, (NIPPV)    Effective Date: 2005, 2017    Revised Date: 2012, 2014, 2015, 2016, 2018, 2019    Reviewed: 2019, 2020   I. Description: Non Invasive Ventilation (NIV) is a ventilatory assist technique used as an alternative to endotracheal intubation. NIV is pressure ventilation delivered as BIPAP® (Bi-level Positive Airway Pressure) which is a low pressure electronically driven device intended for use as a ventilatory support system for patients who have an intact respiratory drive. The device provides non-invasive ventilatory assistance through the use of a nasal or full face mask. Bi-Level  (two levels of ventilatory support) known as inspired positive airway pressure (IPAP) and  positive airway support (EPAP). One level of support can also be delivered which is delivered as EPAP or CPAP which is delivered throughout the respiratory cycle. The aim is to maintain adequate ventilation and minimize the effort of breathing. The BREAS Vivo 50, BIPAP® Vision System and the Respironics V60 are the systems available for non-invasive ventilation. These devices are also capable of being used for invasive ventilation in the critical care units. BIPAP Vision: This device uses an electronic pressure control sensing monitor pressure differential in the patient circuit. This feedback allows for adjustment of the flow and pressure output  to assist in inhalation or exhalation through the administration at two distinct levels of positive pressure. During inspiration, the level is variably positive and is always higher than the expiratory level. During exhalation, pressure is variably positive or near ambient. In addition, this device has the ability to compensate for leaks through automatic adjustment of the trigger threshold.  This capability allows for the application of BIPAP®  for mask-applied ventilation assistance. Respironics V60  The Respironics V60 uses Auto-Trak technology to help ensure patient synchrony and therapy acceptance and is designed to address the specific challenges of NIV. By providing auto-adaptive leak compensation, inspiratory triggering, and expiratory cycling, Auto-Trak delivers optimal synchrony in the face of dynamic leak and changing patient demand. The Respironics V60 is designed to include pediatric use and is equipped with several modes, which allows the practitioner to meet the specific needs of the patients. See Appendix 1 for definitions of settings. CareParent Vivo 50   The Vivo 50 ventilator (with or without the SpO2 and CO2 sensor) is intended to provide continuous or intermittent ventilatory support for the care of individuals who require mechanical ventilation. The Vivo 50 with the SpO2 sensor is intended to measure functional oxygen saturation of arterial hemoglobin (%SpO2) and pulse rate. The Vivo 50 with the CO2 sensor is intended to measure CO2 in the inspiratory and expiratory gas. The device is intended to be used in home, institution, hospitals and portable applications such as wheelchairs and gurneys. It may be used for both invasive and non-invasive ventilation. Suitable for a wide range of pathologies and for patients with changing requirements over time. Multiple circuits: dual limb with exhaled volume measurements, single limb with exhalation valve or leakage port. Multiple modes  Pressure and Volume Modes with Target Volume and SIMV  II. Policy: The administration of non-invasive positive pressure ventilation (NPPV) will be the responsibility of the Respiratory Care Practitioner (RCP). Upon receipt of a physician order, proper patient instruction, set up and monitoring will be provided to ensure patient understanding, compliance and proper utilization of prescribed therapy.   P. A patient may be allowed to use their own NPPV machine after having their equipment checked and approved by [employer]. Q. A consent and Release for Home Ventilator form must be signed by the patient and witnessed by a health care provider if the patient chooses to use his home ventilator. R. A patient that is being treated for acute respiratory failure and placed on non-invasive ventilation must be placed in a critical care unit, per Medical Director. D.  A patient who is being treated for sleep apnea may be treated on the floors. E.   A patient has the option of using a hospital owned NPPV unit. F.   A patient may use a hospital unit and their own mask if they choose. G. Patients may be placed on full face mask with NPPV units on the hospital floors under the following conditions:  1. Patient has an end stage disease process. 2. Patient was placed on full face mask and NPPV unit in the Critical Care Units and it has been established as safe and effective therapy. 3. Patient is ordered full face mask with NPPV unit for home use. 4. In the event patient is to be set up or transitioned to home on a NPPV unit, the Respironics V60 (in the AVAPS mode) shall be utilized while the patient is in the hospital. If a Migdalia Coleman is unavailable, a home NPPV unit may be provided by the DME provider for no more than 48 hours. III. Responsibility: Director, Fadumo Rodriguez, and all Respiratory Care Practitioners with documented competency. IV. Indications for non-invasive ventilation:  N. Acute respiratory failure (Patient must be in Critical Care Unit)  O. Chronic respiratory failure  P. Alveolar hypoventilation  Q. Documented sleep apnea  V. Contraindications:  A. Patients with severe respiratory failure without a spontaneous respiratory drive  B. Noninvasive ventilation may be contraindicated for patients with the followin. Inability to maintain a patent airway or adequately clear secretions  2. Acute sinusitis or otitis media  3.  Risk for aspiration of gastric contents  4. Hypotension  5. Pre-existing pneumothorax or pneumomediastinum  6. Epistaxis  7. Recent facial, oral or skull surgery or trauma  8. history of allergy or sensitivity to mask materials where the risk from allergic reaction outweighs the benefit of ventilatory assistance  C. Potential Complications:  1. Cardiovascular compromise  2. Skin breakdown and discomfort from mask  3. Gastric distention  4. Increased intracranial pressure  5. Pulmonary barotraumas    VI. Mask fit  M. It is essential that the patient be correctly fitted with an appropriate size mask. 12. Choose mask according to sizing template on disposable setups. 13. The mask should fit comfortably on the patient's nose, not occluding the nares and the base of the mask should fit comfortably between the chin and bottom lip see picture on setup guide. 14. Headgear should fit comfortably not tight. The bottom edge of the headgear should sit at the base of the nape of the neck with side straps underneath the earlobes. 15. The face masks are better for patients who are dyspneic and tachypneic as they tend to mouth breathe with a nasal mask and reduce the effectiveness of the ventilation. 12. Masks that are too tight are uncomfortable and may cause pressure areas. Masks that are too loose leak which reduce the efficiency of the system. 17. When using a nasal mask the patient must keep their mouth closed to obtain the desired effect of the ventilation. 18. May place an Allevyn Gentle Border dressing over bridge of nose to avoid skin breakdown. VII. Procedure for Non-invasive ventilation via Vivo 50:   Refer to 's recommendations. VIII. Procedure for non-invasive ventilation using the V60 or BiPAP ® Vision:            Refer to [facility] recommendations. IX. Monitoring:  T. While in use, the RCP should check the patient and non-invasive unit no less than every four hours. U.  Failure of NIV should be suspected if:   4. The patient is unable to maintain adequate oxygenation, decreasing 02 saturations despite increases in 02.  5. There is a reduction in neurological or conscious state. 6. The patient has excessive secretions or increasing respiratory rate. 7. Failure of PaCO2 or PH to improve on ABG sample. 8. The patient has poorly compliant lungs. X. Weaning               A. Weaning or cessation of non-invasive ventilation should occur under the following                        conditions:  12. PaC02 returns to normal and patient maintains O2 saturations with minimal                oxygen. 13. Respiratory rate is returned within normal limits. 14. Patient is unable to tolerate non-invasive ventilation. 15. Patient's dyspnea is reduced. 16. Patient exhibits normal overnight ventilation. 17. Patient is receiving palliative treatment. XI. Documentation:  N. Documentation should include the followin. Ventilator settings comply with physician order. 14. Ventilator is functioning properly as evidenced by a check of measured volumes, rates, pressures and FIO2. 15. Alarms are set appropriately. 16. Measured inspired gas temperature (invasive mode only)  17. Vital signs (pulse, respiratory rate, oxygen saturation, breath sounds)  18. Patient tolerance to therapy. 19. Manual resuscitator and appropriate size mask at patient bedside      REFERENCES  Respironics V60 user Manual .. \. [employer] \Equipment\[id number]-Manual.pdf    VIVO 50 clinical Guide . [employer] \. [employer] \Equipment\VIVO 50 [id number]. pdf     [address] [place] and Respiratory Care Section. FABIÁN Boateng 2001. Introducing non-invasive positive pressure ventilation. Nursing Standard. 15,26, 42-45. Keyshawn Ovens. 2003. Using non-invasive ventilation in acute wards: part 2. Nursing Standard. 18,1, 41-44. Chantel 47.   Use of continuous positive airway pressure (CPAP) in the critically ill-physiological principles. Critical Care 12 ([phone]     BARRY Seth2002. Bi-level positive airway pressure (BiPAP) and acute cardiogenicpulmonary edema   ( ACPO) in the emergency department. Critical Care 15 (2):51-63        DEFINITIONS AND USUAL SETTINGS                                                            APPENDIX 1                      Settings   Settings in  Active   CPAP Settings in  Active   BiPAP Description Range Usual Setting  Used in NIV         CPAP Mode                     Continuous Positive Airway  Pressure   4-24 cmH20 8-14     ST or  BiPAP MODE                           Spontaneous Timed or BiLevel Positive Airway Pressure Ventilation. Two level system of alternating during non- invasive ventilation in sync with breathing-set IPAP and EPAP      __     __   AVAPS Mode    (only available  on V60)          The volume-targeted AVAPS (average volume-assured pressure support) mode combines the attributes of pressure-controlled and volume-targeted ventilation. __     __       EPAP                                       Positive Airway Pressure. Recruits under ventilated alveoli to remain open during expiration by providing a constant pressure throughout resp. cycle.  Must be less than or equal to IPAP    4-25 cmH20 5-14                 Settings Settings in Active CPAP Settings in Active BiPAP Description Range Usual Setting Used in NIV     IPAP                           Inspired Positive Airway Pressure provides pressure throughout the inspiratory phase to support patient ventilation  4-40 cmH20 10-20               I-time                    Inspiratory time- time taken to inspire in seconds  0.3  3.0 sec 1:3   FIO2                   Oxygen delivered  21- 100% As ordered         Ramp time                                      The Ramp Time function helps your patient adapt to ventilation by gradually increasing inspiratory and expiratory pressure over a set interval (minutes). This time gradually delivers pressures so to reduce patient anxiety and increases comfort. Off  or  5-45 minutes 10 minutes                   Rate (RR)          Patient's respiratory rate 4- 60 BPM 4     Rise time          Speed at which the inspiratory pressure rises to the set pressure. 1-5  (1 is the fastest) Set at 3         Patient Data  Data Description Range Usual setting in NIV   Breath phase/trigger Indicator  Bar in left hand corner. Colored according to breath trigger. n/a n/a   PIP Positive Inspiratory pressure  0-50 n/a   Patient total leak Est. or unintentional leak  0-200 n/a   Patient trigger Patient triggered breaths as a percentage  0-100% Should be 100%   Respiratory rate Respiratory rate 0-90 n/a   Ti/Ttot Inspiratory duty cycle or inspiratory time divided by total cycle time  0-91% n/a   Minute volume Est. minute ventilation.  TV x rate=MV  0-99 LPM n/a   Tidal volume Est.  tidal volume  0-3000 ml n/a     Alarms  Alarm Description Range Set  at   [facility] Rate High respiratory rate 5-90 10 breaths above patient's breath rate   Low Rate Low respiratory rate alarm 1-89 BPM 5 breaths below patient's breath rate   Hi VT High tidal volume alarm 200-2500 ml 200 ml above patient's own   Low VT Low tidal volume alarm OFF  1500 ml OFF   HIP High Inspiratory pressure alarm 5-50 cm H20 10 cm above IPAP   LIP Low inspiratory pressure alarm OFF, 1-40 cmH20 OFF   Lo VE Low minute vent alarm OFF, 0.1 to 99L/min OFF   LIP T Low inspiratory delay time 5-60 seconds 5 seconds       CPAP Settings BiPAP Settings     Set CPAP level as ordered Set breath rate as ordered     Set FIO2 as ordered Set I-time as 1.3     Set Ramp time as ordered Set EPAP as ordered     Set C-Flex at 3 Set IPAP as ordered      Set Rise time as ordered      Set FIO2 as ordered           Respiratory Care Services  Consent and Release for Home Ventilator      Patient Name: _________________________________________ Room: ___________    SSN: _______________________________           Account Number:  __________________    Use and care of my personal home ventilator, (invasive or non-invasive) mechanical life support device while at [facility] system has been discussed with me by my physician and I have been provided with an opportunity to ask questions which have been answered to my satisfaction. I also understand a respiratory care practitioner is not expected to remain in my room or general vicinity at all times. It is understood that every precaution consistent with the best medical practice will be taken and I give Respiratory Care Services permission to monitor my ventilator during my hospital stay. I hereby release [facility] system, it's agents, employees and medical staff from liability arising from any and all claims, demands, losses and damages to person and/or property as a result of the above mentioned requests. I certify that I have read and understand this consent agreement and release and that I am legally qualified to mireya this authorization. ___________________  Date    _____________________________________        ________________________  Signature of Patient or Parent (of minor patient,                  Relationship (if other than Patient)  alf parent) if applicable. Closest Relative,  Guardian or legal Representative    _____________________________________  Witness  Legal representative is defined as person named by the court as a guardian, executor or , or having power of  specifically set forth to authorize this action.     [phone]3/02, 7/14[facility]   Consent and Release for Home Ventilator

## 2021-10-01 NOTE — PROGRESS NOTES
1121  Patient c/o \"fluttering in my chest and I feel anxious a little\". Patient o2 sat 85 % on 8 L HF NC. This RN increased NC to 12 L HF NC. O2 sat increased to 90 %. RT, Lucia Gaona, notified of the above. Isaura, RT, to assess patient. Terrence Jaramillo NP, notified of the above. Orders received for remote telemetry. 1133  D. Dimer > 20. Terrence Jaramillo NP, notified. Orders received for CT of chest.  Monitor room states there are not any remote telemetry wires available at this time. Terrence Jaramillo NP, notified. 1204  This RN placed 20 g in left AC. Active labs collected and sent to lab via tube system. Mae Cai in monitor room states wires are now available for remote telemetry. Will obtain telemetry and place on patient.

## 2021-10-01 NOTE — PROGRESS NOTES
No acute events overnight. Pt is alert and oriented x4, resting comfortably in bed with eyes closed on 10L HFNC. RR are even and unlabored, no apparent distress noted. Call light within reach, safety measures in place. Will prepare for bedside shift report to oncoming RN.

## 2021-10-01 NOTE — PROGRESS NOTES
Received report from Mary Lou, [facility]. Patient awake resting in bed. Respirations present. On 10 L NC. No signs of distress. AxO x4. No needs expressed. Bed low and locked. Call light within reach. Will continue to monitor.

## 2021-10-02 LAB
ANION GAP SERPL CALC-SCNC: 7 MMOL/L (ref 7–16)
BASOPHILS # BLD: 0.1 K/UL (ref 0–0.2)
BASOPHILS NFR BLD: 1 % (ref 0–2)
BNP SERPL-MCNC: 61 PG/ML (ref 5–125)
BUN SERPL-MCNC: 18 MG/DL (ref 6–23)
CALCIUM SERPL-MCNC: 8.6 MG/DL (ref 8.3–10.4)
CHLORIDE SERPL-SCNC: 102 MMOL/L (ref 98–107)
CO2 SERPL-SCNC: 28 MMOL/L (ref 21–32)
CREAT SERPL-MCNC: 0.89 MG/DL (ref 0.8–1.5)
CRP SERPL HS-MCNC: 13.6 MG/L
DIFFERENTIAL METHOD BLD: ABNORMAL
EOSINOPHIL # BLD: 0.1 K/UL (ref 0–0.8)
EOSINOPHIL NFR BLD: 1 % (ref 0.5–7.8)
ERYTHROCYTE [DISTWIDTH] IN BLOOD BY AUTOMATED COUNT: 12.1 % (ref 11.9–14.6)
GLUCOSE SERPL-MCNC: 107 MG/DL (ref 65–100)
HCT VFR BLD AUTO: 41.9 % (ref 41.1–50.3)
HGB BLD-MCNC: 14.5 G/DL (ref 13.6–17.2)
IMM GRANULOCYTES # BLD AUTO: 0.7 K/UL (ref 0–0.5)
IMM GRANULOCYTES NFR BLD AUTO: 5 % (ref 0–5)
LYMPHOCYTES # BLD: 1.4 K/UL (ref 0.5–4.6)
LYMPHOCYTES NFR BLD: 10 % (ref 13–44)
MCH RBC QN AUTO: 31.7 PG (ref 26.1–32.9)
MCHC RBC AUTO-ENTMCNC: 34.6 G/DL (ref 31.4–35)
MCV RBC AUTO: 91.7 FL (ref 79.6–97.8)
MONOCYTES # BLD: 1 K/UL (ref 0.1–1.3)
MONOCYTES NFR BLD: 7 % (ref 4–12)
NEUTS SEG # BLD: 10.6 K/UL (ref 1.7–8.2)
NEUTS SEG NFR BLD: 77 % (ref 43–78)
NRBC # BLD: 0 K/UL (ref 0–0.2)
PLATELET # BLD AUTO: 147 K/UL (ref 150–450)
PMV BLD AUTO: 10.2 FL (ref 9.4–12.3)
POTASSIUM SERPL-SCNC: 4.7 MMOL/L (ref 3.5–5.1)
PROCALCITONIN SERPL-MCNC: 0.17 NG/ML
RBC # BLD AUTO: 4.57 M/UL (ref 4.23–5.6)
SODIUM SERPL-SCNC: 137 MMOL/L (ref 136–145)
UFH PPP CHRO-ACNC: 0.29 IU/ML (ref 0.3–0.7)
UFH PPP CHRO-ACNC: 0.4 IU/ML (ref 0.3–0.7)
WBC # BLD AUTO: 13.8 K/UL (ref 4.3–11.1)

## 2021-10-02 PROCEDURE — 74011250636 HC RX REV CODE- 250/636: Performed by: NURSE PRACTITIONER

## 2021-10-02 PROCEDURE — 74011250636 HC RX REV CODE- 250/636: Performed by: HOSPITALIST

## 2021-10-02 PROCEDURE — 94760 N-INVAS EAR/PLS OXIMETRY 1: CPT

## 2021-10-02 PROCEDURE — 74011250637 HC RX REV CODE- 250/637: Performed by: PHYSICIAN ASSISTANT

## 2021-10-02 PROCEDURE — 85025 COMPLETE CBC W/AUTO DIFF WBC: CPT

## 2021-10-02 PROCEDURE — 80048 BASIC METABOLIC PNL TOTAL CA: CPT

## 2021-10-02 PROCEDURE — 94640 AIRWAY INHALATION TREATMENT: CPT

## 2021-10-02 PROCEDURE — 85520 HEPARIN ASSAY: CPT

## 2021-10-02 PROCEDURE — 74011250637 HC RX REV CODE- 250/637: Performed by: FAMILY MEDICINE

## 2021-10-02 PROCEDURE — 74011250637 HC RX REV CODE- 250/637: Performed by: NURSE PRACTITIONER

## 2021-10-02 PROCEDURE — 86141 C-REACTIVE PROTEIN HS: CPT

## 2021-10-02 PROCEDURE — 84145 PROCALCITONIN (PCT): CPT

## 2021-10-02 PROCEDURE — 65270000029 HC RM PRIVATE

## 2021-10-02 PROCEDURE — 36415 COLL VENOUS BLD VENIPUNCTURE: CPT

## 2021-10-02 PROCEDURE — 74011250636 HC RX REV CODE- 250/636: Performed by: FAMILY MEDICINE

## 2021-10-02 PROCEDURE — 77010033711 HC HIGH FLOW OXYGEN

## 2021-10-02 PROCEDURE — 83880 ASSAY OF NATRIURETIC PEPTIDE: CPT

## 2021-10-02 RX ORDER — HEPARIN SODIUM 5000 [USP'U]/ML
20 INJECTION, SOLUTION INTRAVENOUS; SUBCUTANEOUS ONCE
Status: COMPLETED | OUTPATIENT
Start: 2021-10-02 | End: 2021-10-02

## 2021-10-02 RX ADMIN — OXYCODONE HYDROCHLORIDE AND ACETAMINOPHEN 500 MG: 500 TABLET ORAL at 16:33

## 2021-10-02 RX ADMIN — ALBUTEROL SULFATE 2 PUFF: 90 AEROSOL, METERED RESPIRATORY (INHALATION) at 08:21

## 2021-10-02 RX ADMIN — HEPARIN SODIUM 1950 UNITS: 5000 INJECTION INTRAVENOUS; SUBCUTANEOUS at 06:02

## 2021-10-02 RX ADMIN — Medication 1 CAPSULE: at 09:38

## 2021-10-02 RX ADMIN — Medication 10 ML: at 22:40

## 2021-10-02 RX ADMIN — FAMOTIDINE 20 MG: 20 TABLET, FILM COATED ORAL at 16:33

## 2021-10-02 RX ADMIN — DEXAMETHASONE 6 MG: 4 TABLET ORAL at 09:38

## 2021-10-02 RX ADMIN — APIXABAN 10 MG: 5 TABLET, FILM COATED ORAL at 16:33

## 2021-10-02 RX ADMIN — ALBUTEROL SULFATE 2 PUFF: 90 AEROSOL, METERED RESPIRATORY (INHALATION) at 02:00

## 2021-10-02 RX ADMIN — Medication 10 ML: at 16:33

## 2021-10-02 RX ADMIN — Medication 10 ML: at 06:13

## 2021-10-02 RX ADMIN — OXYCODONE HYDROCHLORIDE AND ACETAMINOPHEN 500 MG: 500 TABLET ORAL at 09:38

## 2021-10-02 RX ADMIN — ALBUTEROL SULFATE 2 PUFF: 90 AEROSOL, METERED RESPIRATORY (INHALATION) at 14:50

## 2021-10-02 RX ADMIN — FAMOTIDINE 20 MG: 20 TABLET, FILM COATED ORAL at 09:38

## 2021-10-02 RX ADMIN — HEPARIN SODIUM 18 UNITS/KG/HR: 5000 INJECTION, SOLUTION INTRAVENOUS at 04:26

## 2021-10-02 NOTE — PROGRESS NOTES
Patient stable with no complaints at this time. Patient relaxing in bed watching TV and face timing his wife. Patient denies any pain and appears comfortable at this time. Call light within reach and patient instructed to call if assistance is needed.   Report to be given to oncoming RN 7p-7a

## 2021-10-02 NOTE — PROGRESS NOTES
Pt is resting in bed at this time. Pt is on 11L NC. Pt is on Heparin gtt at 20units/kg/hr. Pt has no s/sx of acute distress at this time. Pt has safety measures in place. Pt has call light within reach.  Report given to [facility]

## 2021-10-02 NOTE — PROGRESS NOTES
Patient voices no complaints at this time. Patient is resting in bed watching Football. Patient denies any pain and appears comfortable at this time. Call light within reach and patient instructed to call if assistance is needed. Will continue to follow.

## 2021-10-02 NOTE — PROGRESS NOTES
Hospitalist Progress Note   Admit Date:  2021 12:00 PM   Name:  Harriett Ortiz   Age:  39 y.o. Sex:  male  :  1980   MRN:  [id number]   Room:  [facility]    Presenting Complaint: Shortness of Breath    Reason(s) for Admission: COVID-19 [U07.1]  Acute respiratory failure [facility]) [J96.00]     Hospital Course & Interval History:   Patient is a 37 y. o. male who presented to the ED for cc worsening SOB after being diagnosed with COVID . Hx of HTN but not on any medications       Subjective (10/02/21):  \"I am breathing ok; better I think. My legs still ache if you push on it. \"  Pleasant 41y.o. WM sitting up in bed without new complaints. Review of Systems:  10 systems reviewed and negative except as noted in HPI.       Assessment & Plan:     Principal Problem:  Acute Respiratory Failure with COVID-19 Pneumonia (2021)  - cont decadron   - tolerating supplemental O2 wean, down from 11L hiflow NC to 9L hiflow NC today     Active Problems:  Provoked BLE DVT  - newly dx'd 10/1/21; no gross bleeding; transition to eliquis 10mg bid x 7 days start today, then 5mg bid thereafter    Essential hypertension (2017)  - stable without oral agents     NICOLE (obstructive sleep apnea) (2021)  - Cont CPAP at HS     Transaminitis   - resolving  - f/u AM LFTs            Dispo/Discharge Planning:    - home once medically stable    Diet:  ADULT DIET Regular; Low Fat/Low Chol/High Fiber/2 gm Na  DVT PPx: Heparin gtt transition to apixaban today  Code status: Full Code    Hospital Problems as of 10/2/2021 Date Reviewed: 2018        Codes Class Noted - Resolved POA    Acute deep vein thrombosis (DVT) of both lower extremities ([facility] [address] ICD-10-CM: I82.403  ICD-9-CM: 453.40  10/1/2021 - Present Unknown        Acute respiratory failure ([facility] [address] ICD-10-CM: J96.00  ICD-9-CM: 518.81  2021 - Present Unknown        * (Principal) COVID-19 ICD-10-CM: U07.1  ICD-9-CM: 079.89  2021 - Present Unknown NICOLE (obstructive sleep apnea) ICD-10-CM: G47.33  ICD-9-CM: 327.23  9/28/2021 - Present Unknown        Essential hypertension ICD-10-CM: I10  ICD-9-CM: 401.9  11/27/2017 - Present Yes              Objective:     Patient Vitals for the past 24 hrs:   Temp Pulse Resp BP SpO2   10/02/21 1147 98.4 °F (36.9 °C) 85 20 133/84 96 %   10/02/21 0821  82   93 %   10/02/21 0800 97.9 °F (36.6 °C) 81 20 118/80 96 %   10/02/21 0400 98.1 °F (36.7 °C) 80 20 127/79 94 %   10/02/21 0200     93 %   10/02/21 0032 98.2 °F (36.8 °C) 73 20 129/85 92 %   10/01/21 2055     92 %   10/01/21 2022  75      10/01/21 2010 98.2 °F (36.8 °C) 84 20 133/89 93 %   10/01/21 1653     91 %   10/01/21 1600  80      10/01/21 1536 97.8 °F (36.6 °C) 79 21 (!) 142/84 93 %     Oxygen Therapy  O2 Sat (%): 96 % (10/02/21 1147)  Pulse via Oximetry: 81 beats per minute (10/02/21 0821)  O2 Device: Hi flow nasal cannula (10/02/21 0821)  Skin Assessment: Clean, dry, & intact (10/01/21 0611)  O2 Flow Rate (L/min): 11 l/min (10/02/21 0821)    Estimated body mass index is 32.25 kg/m² as calculated from the following:    Height as of this encounter: 6' 3\" (1.905 m). Weight as of this encounter: 117 kg (258 lb). Intake/Output Summary (Last 24 hours) at 10/2/2021 1413  Last data filed at 10/2/2021 1147  Gross per 24 hour   Intake 240 ml   Output 3150 ml   Net -2910 ml         Physical Exam:     General:    Well nourished. No overt distress  Head:  Normocephalic, atraumatic  Eyes:  Sclerae appear normal.  Pupils equally round. ENT:  Nares appear normal, no drainage. Moist oral mucosa  Neck:  No restricted ROM. Trachea midline   CV:   RRR. No m/r/g. No jugular venous distension. Lungs:   CTAB. No wheezing, rhonchi, or rales. Respirations even, unlabored  Abdomen: Bowel sounds present. Soft, nontender, nondistended. Extremities: No cyanosis or clubbing. No edema  Skin:     No rashes and normal coloration. Warm and dry. Neuro:  Cranial nerves II-XII grossly intact. A&Ox3  Psych:  Normal mood and affect. I have reviewed ordered lab tests and independently visualized imaging below:    Last 24hr Labs:  Recent Results (from the past 24 hour(s))   HEPARIN XA UFH    Collection Time: 10/01/21  8:12 PM   Result Value Ref Range    Heparin Xa UFH 0.41 0.3 - 0.7 IU/mL   CBC WITH AUTOMATED DIFF    Collection Time: 10/02/21  4:12 AM   Result Value Ref Range    WBC 13.8 (H) 4.3 - 11.1 K/uL    RBC 4.57 4.23 - 5.6 M/uL    HGB 14.5 13.6 - 17.2 g/dL    HCT 41.9 41.1 - 50.3 %    MCV 91.7 79.6 - 97.8 FL    MCH 31.7 26.1 - 32.9 PG    MCHC 34.6 31.4 - 35.0 g/dL    RDW 12.1 11.9 - 14.6 %    PLATELET 208 (L) 993 - 450 K/uL    MPV 10.2 9.4 - 12.3 FL    ABSOLUTE NRBC 0.00 0.0 - 0.2 K/uL    DF AUTOMATED      NEUTROPHILS 77 43 - 78 %    LYMPHOCYTES 10 (L) 13 - 44 %    MONOCYTES 7 4.0 - 12.0 %    EOSINOPHILS 1 0.5 - 7.8 %    BASOPHILS 1 0.0 - 2.0 %    IMMATURE GRANULOCYTES 5 0.0 - 5.0 %    ABS. NEUTROPHILS 10.6 (H) 1.7 - 8.2 K/UL    ABS. LYMPHOCYTES 1.4 0.5 - 4.6 K/UL    ABS. MONOCYTES 1.0 0.1 - 1.3 K/UL    ABS. EOSINOPHILS 0.1 0.0 - 0.8 K/UL    ABS. BASOPHILS 0.1 0.0 - 0.2 K/UL    ABS. IMM.  GRANS. 0.7 (H) 0.0 - 0.5 K/UL   METABOLIC PANEL, BASIC    Collection Time: 10/02/21  4:12 AM   Result Value Ref Range    Sodium 137 136 - 145 mmol/L    Potassium 4.7 3.5 - 5.1 mmol/L    Chloride 102 98 - 107 mmol/L    CO2 28 21 - 32 mmol/L    Anion gap 7 7 - 16 mmol/L    Glucose 107 (H) 65 - 100 mg/dL    BUN 18 6 - 23 MG/DL    Creatinine 0.89 0.8 - 1.5 MG/DL    GFR est AA >60 >60 ml/min/1.73m2    GFR est non-AA >60 >60 ml/min/1.73m2    Calcium 8.6 8.3 - 10.4 MG/DL   CRP, HIGH SENSITIVITY    Collection Time: 10/02/21  4:12 AM   Result Value Ref Range    CRP, High sensitivity 13.6 mg/L   NT-PRO BNP    Collection Time: 10/02/21  4:12 AM   Result Value Ref Range    NT pro-BNP 61 5 - 125 PG/ML   PROCALCITONIN    Collection Time: 10/02/21  4:12 AM   Result Value Ref Range    Procalcitonin 0.17 ng/mL   HEPARIN XA UFH    Collection Time: 10/02/21  4:12 AM   Result Value Ref Range    Heparin Xa UFH 0.29 (L) 0.3 - 0.7 IU/mL   HEPARIN XA UFH    Collection Time: 10/02/21 12:04 PM   Result Value Ref Range    Heparin Xa UFH 0.40 0.3 - 0.7 IU/mL       All Micro Results     Procedure Component Value Units Date/Time    COVID-19 RAPID TEST [[id number]]  (Abnormal) Collected: 09/28/21 1341    Order Status: Completed Specimen: Nasopharyngeal Updated: 09/28/21 1354     Specimen source NASAL        COVID-19 rapid test Detected        Comment:      The specimen is POSITIVE for SARS-CoV-2, the novel coronavirus associated with COVID-19. This test has been authorized by the FDA under an Emergency Use Authorization (EUA) for use by authorized laboratories. Fact sheet for Healthcare Providers: kstattoo.com  Fact sheet for Patients: kstattoo.com       Methodology: Isothermal Nucleic Acid Amplification               Other Studies:  No results found.     Current Meds:  Current Facility-Administered Medications   Medication Dose Route Frequency    influenza vaccine 2021-22 (6 mos+)(PF) (FLUARIX/FLULAVAL/FLUZONE QUAD) injection 0.5 mL  1 Each IntraMUSCular PRIOR TO DISCHARGE    apixaban (ELIQUIS) tablet 10 mg  10 mg Oral BID    dexAMETHasone (DECADRON) tablet 6 mg  6 mg Oral DAILY    famotidine (PEPCID) tablet 20 mg  20 mg Oral BID    alum-mag hydroxide-simeth (MYLANTA) oral suspension 30 mL  30 mL Oral Q4H PRN    sodium chloride (NS) flush 5-40 mL  5-40 mL IntraVENous Q8H    sodium chloride (NS) flush 5-40 mL  5-40 mL IntraVENous PRN    acetaminophen (TYLENOL) tablet 650 mg  650 mg Oral Q6H PRN    Or    acetaminophen (TYLENOL) suppository 650 mg  650 mg Rectal Q6H PRN    polyethylene glycol (MIRALAX) packet 17 g  17 g Oral DAILY PRN    ondansetron (ZOFRAN ODT) tablet 4 mg  4 mg Oral Q8H PRN    Or    ondansetron (ZOFRAN) injection 4 mg  4 mg IntraVENous Q6H PRN    ascorbic acid (vitamin C) (VITAMIN C) tablet 500 mg  500 mg Oral BID    albuterol (PROVENTIL HFA, VENTOLIN HFA, PROAIR HFA) inhaler 2 Puff  2 Puff Inhalation Q6H RT    HYDROcodone-homatropine (HYCODAN) 5-1.5 mg/5 mL (5 mL) oral solution 5 mL  5 mL Oral Q4H PRN    loperamide (IMODIUM) capsule 2 mg  2 mg Oral Q4H PRN    hydrALAZINE (APRESOLINE) 20 mg/mL injection 20 mg  20 mg IntraVENous Q6H PRN    zinc sulfate (ZINCATE) 50 mg zinc (220 mg) capsule 1 Capsule  1 Capsule Oral DAILY    hydrocortisone (ANUSOL-HC) 2.5 % rectal cream   PeriANAL QID PRN    diphenhydrAMINE (BENADRYL) capsule 50 mg  50 mg Oral QHS PRN       Signed:  PIPPA Griffiths    Part of this note may have been written by using a voice dictation software. The note has been proof read but may still contain some grammatical/other typographical errors.

## 2021-10-02 NOTE — PROGRESS NOTES
Pt is resting in bed at this time. Pt is alert and oriented times 4. Pt is on 11L HFNC. Pt has no s/sx of acute distress at this time. Pt has heparin gtt going at 18units/kg/hr. Pt has no requests at this time. Pt has safety measures within reach. Pt has call light within reach and is encouraged to call for assistance if needed. Will continue to monitor.

## 2021-10-02 NOTE — PROGRESS NOTES
Patient resting in bed with no complaints at this time. Patient is alert and orientated with no distress noted. IV intact and patent with no s/s of infection noted. Respirations even and unlabored with heart rate regular. Patient able to ambulate independently without assistance. Patient has bilateral DVT and PE; patient only up to restroom. Bed in low locked position with call light within reach. Patient instructed to call if assistance is needed. Will continue to monitor.

## 2021-10-03 LAB
ALBUMIN SERPL-MCNC: 2.7 G/DL (ref 3.5–5)
ALBUMIN/GLOB SERPL: 0.6 {RATIO} (ref 1.2–3.5)
ALP SERPL-CCNC: 62 U/L (ref 50–136)
ALT SERPL-CCNC: 145 U/L (ref 12–65)
ANION GAP SERPL CALC-SCNC: 8 MMOL/L (ref 7–16)
AST SERPL-CCNC: 31 U/L (ref 15–37)
BASOPHILS # BLD: 0.1 K/UL (ref 0–0.2)
BASOPHILS NFR BLD: 1 % (ref 0–2)
BILIRUB SERPL-MCNC: 0.7 MG/DL (ref 0.2–1.1)
BUN SERPL-MCNC: 20 MG/DL (ref 6–23)
CALCIUM SERPL-MCNC: 9.1 MG/DL (ref 8.3–10.4)
CHLORIDE SERPL-SCNC: 103 MMOL/L (ref 98–107)
CO2 SERPL-SCNC: 26 MMOL/L (ref 21–32)
CREAT SERPL-MCNC: 0.83 MG/DL (ref 0.8–1.5)
DIFFERENTIAL METHOD BLD: ABNORMAL
EOSINOPHIL # BLD: 0.1 K/UL (ref 0–0.8)
EOSINOPHIL NFR BLD: 1 % (ref 0.5–7.8)
ERYTHROCYTE [DISTWIDTH] IN BLOOD BY AUTOMATED COUNT: 11.9 % (ref 11.9–14.6)
GLOBULIN SER CALC-MCNC: 4.6 G/DL (ref 2.3–3.5)
GLUCOSE SERPL-MCNC: 91 MG/DL (ref 65–100)
HCT VFR BLD AUTO: 42.7 % (ref 41.1–50.3)
HGB BLD-MCNC: 14.9 G/DL (ref 13.6–17.2)
IMM GRANULOCYTES # BLD AUTO: 0.7 K/UL (ref 0–0.5)
IMM GRANULOCYTES NFR BLD AUTO: 5 % (ref 0–5)
LYMPHOCYTES # BLD: 1.4 K/UL (ref 0.5–4.6)
LYMPHOCYTES NFR BLD: 10 % (ref 13–44)
MCH RBC QN AUTO: 31.4 PG (ref 26.1–32.9)
MCHC RBC AUTO-ENTMCNC: 34.9 G/DL (ref 31.4–35)
MCV RBC AUTO: 89.9 FL (ref 79.6–97.8)
MONOCYTES # BLD: 1.3 K/UL (ref 0.1–1.3)
MONOCYTES NFR BLD: 9 % (ref 4–12)
NEUTS SEG # BLD: 10.7 K/UL (ref 1.7–8.2)
NEUTS SEG NFR BLD: 75 % (ref 43–78)
NRBC # BLD: 0 K/UL (ref 0–0.2)
PLATELET # BLD AUTO: 142 K/UL (ref 150–450)
PMV BLD AUTO: 10.4 FL (ref 9.4–12.3)
POTASSIUM SERPL-SCNC: 4.6 MMOL/L (ref 3.5–5.1)
PROT SERPL-MCNC: 7.3 G/DL (ref 6.3–8.2)
RBC # BLD AUTO: 4.75 M/UL (ref 4.23–5.6)
SODIUM SERPL-SCNC: 137 MMOL/L (ref 136–145)
WBC # BLD AUTO: 14.2 K/UL (ref 4.3–11.1)

## 2021-10-03 PROCEDURE — 80053 COMPREHEN METABOLIC PANEL: CPT

## 2021-10-03 PROCEDURE — 36415 COLL VENOUS BLD VENIPUNCTURE: CPT

## 2021-10-03 PROCEDURE — 74011250636 HC RX REV CODE- 250/636: Performed by: FAMILY MEDICINE

## 2021-10-03 PROCEDURE — 77010033678 HC OXYGEN DAILY

## 2021-10-03 PROCEDURE — 94760 N-INVAS EAR/PLS OXIMETRY 1: CPT

## 2021-10-03 PROCEDURE — 74011250637 HC RX REV CODE- 250/637: Performed by: NURSE PRACTITIONER

## 2021-10-03 PROCEDURE — 85025 COMPLETE CBC W/AUTO DIFF WBC: CPT

## 2021-10-03 PROCEDURE — 94640 AIRWAY INHALATION TREATMENT: CPT

## 2021-10-03 PROCEDURE — 65270000029 HC RM PRIVATE

## 2021-10-03 PROCEDURE — 74011250637 HC RX REV CODE- 250/637: Performed by: FAMILY MEDICINE

## 2021-10-03 PROCEDURE — 74011250637 HC RX REV CODE- 250/637: Performed by: PHYSICIAN ASSISTANT

## 2021-10-03 RX ADMIN — APIXABAN 10 MG: 5 TABLET, FILM COATED ORAL at 21:10

## 2021-10-03 RX ADMIN — FAMOTIDINE 20 MG: 20 TABLET, FILM COATED ORAL at 18:22

## 2021-10-03 RX ADMIN — Medication 10 ML: at 18:22

## 2021-10-03 RX ADMIN — Medication 1 CAPSULE: at 10:09

## 2021-10-03 RX ADMIN — FAMOTIDINE 20 MG: 20 TABLET, FILM COATED ORAL at 10:11

## 2021-10-03 RX ADMIN — OXYCODONE HYDROCHLORIDE AND ACETAMINOPHEN 500 MG: 500 TABLET ORAL at 10:09

## 2021-10-03 RX ADMIN — ALBUTEROL SULFATE 2 PUFF: 90 AEROSOL, METERED RESPIRATORY (INHALATION) at 11:29

## 2021-10-03 RX ADMIN — ALBUTEROL SULFATE 2 PUFF: 90 AEROSOL, METERED RESPIRATORY (INHALATION) at 21:30

## 2021-10-03 RX ADMIN — APIXABAN 10 MG: 5 TABLET, FILM COATED ORAL at 10:09

## 2021-10-03 RX ADMIN — Medication 10 ML: at 05:04

## 2021-10-03 RX ADMIN — Medication 10 ML: at 21:10

## 2021-10-03 RX ADMIN — OXYCODONE HYDROCHLORIDE AND ACETAMINOPHEN 500 MG: 500 TABLET ORAL at 18:22

## 2021-10-03 RX ADMIN — ALBUTEROL SULFATE 2 PUFF: 90 AEROSOL, METERED RESPIRATORY (INHALATION) at 17:59

## 2021-10-03 RX ADMIN — DEXAMETHASONE 6 MG: 4 TABLET ORAL at 10:09

## 2021-10-03 NOTE — PROGRESS NOTES
Patient resting in bed with no pain or distress noted. Telemetry on and working with SR.  02 at 8 liters via NC. Call light in reach and will prepare bedside shift report for oncoming nurse.

## 2021-10-03 NOTE — PROGRESS NOTES
Hospitalist Progress Note   Admit Date:  2021 12:00 PM   Name:  Mark Donnelly   Age:  39 y.o. Sex:  male  :  1980   MRN:  [id number]   Room:  [facility]    Presenting Complaint: Shortness of Breath    Reason(s) for Admission: COVID-19 [U07.1]  Acute respiratory failure [facility]) [J96.00]     Hospital Course & Interval History:   Patient is a 37 y. o. male who presented to the ED for cc worsening SOB after being diagnosed with COVID . Hx of HTN but not on any medications       Subjective (10/03/21):  \"I'm down to 8L. \"  Good spirited 41y.o. WM in NAD    Review of Systems:  10 systems reviewed and negative except as noted in HPI.       Assessment & Plan:     Principal Problem:  Acute Respiratory Failure with COVID-19 Pneumonia (2021)  - cont decadron   - tolerating supplemental O2 wean, down to 7-8L hiflow via NC today     Active Problems:  Provoked BLE DVT  - newly dx'd 10/1/21  - cont eliquis  - h/h stable    Essential hypertension (2017)  - stable without oral agents     NICOLE (obstructive sleep apnea) (2021)  - Cont CPAP at HS     Transaminitis   - anticipate resolution  - denies GI symptoms            Dispo/Discharge Planning:    - home once medically stable    Diet:  ADULT DIET Regular; Low Fat/Low Chol/High Fiber/2 gm Na  DVT PPx: apixaban  Code status: Full Code    Hospital Problems as of 10/3/2021 Date Reviewed: 2018        Codes Class Noted - Resolved POA    Acute deep vein thrombosis (DVT) of both lower extremities ([facility] [address] ICD-10-CM: I82.403  ICD-9-CM: 453.40  10/1/2021 - Present Unknown        Acute respiratory failure (H[facility]) ICD-10-CM: J96.00  ICD-9-CM: 518.81  2021 - Present Unknown        * (Principal) COVID-19 ICD-10-CM: U07.1  ICD-9-CM: 079.89  2021 - Present Unknown        NICOLE (obstructive sleep apnea) ICD-10-CM: F51.32  ICD-9-CM: 327.23  2021 - Present Unknown        Essential hypertension ICD-10-CM: I10  ICD-9-CM: 401.9  2017 - Present Yes              Objective:     Patient Vitals for the past 24 hrs:   Temp Pulse Resp BP SpO2   10/03/21 1246 97.7 °F (36.5 °C) 83 20 133/85 92 %   10/03/21 1129     91 %   10/03/21 0715 97.7 °F (36.5 °C) 81 20 118/73 95 %   10/03/21 0302 97.9 °F (36.6 °C) 71 20 (!) 149/88 95 %   10/03/21 0005     92 %   10/02/21 2330 97.7 °F (36.5 °C) 75 18 117/84 95 %   10/02/21 1930 97.7 °F (36.5 °C) 90 18 127/86 94 %   10/02/21 1518 97.7 °F (36.5 °C) 89 20 125/76 94 %   10/02/21 1450     91 %     Oxygen Therapy  O2 Sat (%): 92 % (10/03/21 1246)  Pulse via Oximetry: 90 beats per minute (10/03/21 1129)  O2 Device: Hi flow nasal cannula (10/03/21 1129)  Skin Assessment: Clean, dry, & intact (10/01/21 0611)  O2 Flow Rate (L/min): 5 l/min (10/03/21 1129)    Estimated body mass index is 32.25 kg/m² as calculated from the following:    Height as of this encounter: 6' 3\" (1.905 m). Weight as of this encounter: 117 kg (258 lb). Intake/Output Summary (Last 24 hours) at 10/3/2021 1417  Last data filed at 10/2/2021 2330  Gross per 24 hour   Intake 340 ml   Output 1725 ml   Net -1385 ml         Physical Exam:     General:    Well nourished. No overt distress  Head:  Normocephalic, atraumatic  Eyes:  Sclerae appear normal.  Pupils equally round. ENT:  Nares appear normal, no drainage. Moist oral mucosa  Neck:  No restricted ROM. Trachea midline   CV:   RRR. No m/r/g. No jugular venous distension. Lungs:   CTAB. No wheezing, rhonchi, or rales. Respirations even, unlabored  Abdomen: Bowel sounds present. Soft, nontender, nondistended. Extremities: No cyanosis or clubbing. No edema, calf tenderness b/l  Skin:     No rashes and normal coloration. Warm and dry. Neuro:  Cranial nerves II-XII grossly intact. A&Ox3  Psych:  Normal mood and affect.       I have reviewed ordered lab tests and independently visualized imaging below:    Last 24hr Labs:  Recent Results (from the past 24 hour(s))   METABOLIC PANEL, COMPREHENSIVE    Collection Time: 10/03/21  7:27 AM   Result Value Ref Range    Sodium 137 136 - 145 mmol/L    Potassium 4.6 3.5 - 5.1 mmol/L    Chloride 103 98 - 107 mmol/L    CO2 26 21 - 32 mmol/L    Anion gap 8 7 - 16 mmol/L    Glucose 91 65 - 100 mg/dL    BUN 20 6 - 23 MG/DL    Creatinine 0.83 0.8 - 1.5 MG/DL    GFR est AA >60 >60 ml/min/1.73m2    GFR est non-AA >60 >60 ml/min/1.73m2    Calcium 9.1 8.3 - 10.4 MG/DL    Bilirubin, total 0.7 0.2 - 1.1 MG/DL    ALT (SGPT) 145 (H) 12 - 65 U/L    AST (SGOT) 31 15 - 37 U/L    Alk. phosphatase 62 50 - 136 U/L    Protein, total 7.3 6.3 - 8.2 g/dL    Albumin 2.7 (L) 3.5 - 5.0 g/dL    Globulin 4.6 (H) 2.3 - 3.5 g/dL    A-G Ratio 0.6 (L) 1.2 - 3.5     CBC WITH AUTOMATED DIFF    Collection Time: 10/03/21  7:27 AM   Result Value Ref Range    WBC 14.2 (H) 4.3 - 11.1 K/uL    RBC 4.75 4.23 - 5.6 M/uL    HGB 14.9 13.6 - 17.2 g/dL    HCT 42.7 41.1 - 50.3 %    MCV 89.9 79.6 - 97.8 FL    MCH 31.4 26.1 - 32.9 PG    MCHC 34.9 31.4 - 35.0 g/dL    RDW 11.9 11.9 - 14.6 %    PLATELET 857 (L) 001 - 450 K/uL    MPV 10.4 9.4 - 12.3 FL    ABSOLUTE NRBC 0.00 0.0 - 0.2 K/uL    DF AUTOMATED      NEUTROPHILS 75 43 - 78 %    LYMPHOCYTES 10 (L) 13 - 44 %    MONOCYTES 9 4.0 - 12.0 %    EOSINOPHILS 1 0.5 - 7.8 %    BASOPHILS 1 0.0 - 2.0 %    IMMATURE GRANULOCYTES 5 0.0 - 5.0 %    ABS. NEUTROPHILS 10.7 (H) 1.7 - 8.2 K/UL    ABS. LYMPHOCYTES 1.4 0.5 - 4.6 K/UL    ABS. MONOCYTES 1.3 0.1 - 1.3 K/UL    ABS. EOSINOPHILS 0.1 0.0 - 0.8 K/UL    ABS. BASOPHILS 0.1 0.0 - 0.2 K/UL    ABS. IMM.  GRANS. 0.7 (H) 0.0 - 0.5 K/UL       All Micro Results     Procedure Component Value Units Date/Time    COVID-19 RAPID TEST [[id number]]  (Abnormal) Collected: 09/28/21 1341    Order Status: Completed Specimen: Nasopharyngeal Updated: 09/28/21 1354     Specimen source NASAL        COVID-19 rapid test Detected        Comment:      The specimen is POSITIVE for SARS-CoV-2, the novel coronavirus associated with COVID-19. This test has been authorized by the FDA under an Emergency Use Authorization (EUA) for use by authorized laboratories. Fact sheet for Healthcare Providers: ConventionUpdate.co.nz  Fact sheet for Patients: ConventionUpdate.co.nz       Methodology: Isothermal Nucleic Acid Amplification               Other Studies:  No results found.     Current Meds:  Current Facility-Administered Medications   Medication Dose Route Frequency    influenza vaccine 2021-22 (6 mos+)(PF) (FLUARIX/FLULAVAL/FLUZONE QUAD) injection 0.5 mL  1 Each IntraMUSCular PRIOR TO DISCHARGE    apixaban (ELIQUIS) tablet 10 mg  10 mg Oral BID    dexAMETHasone (DECADRON) tablet 6 mg  6 mg Oral DAILY    famotidine (PEPCID) tablet 20 mg  20 mg Oral BID    alum-mag hydroxide-simeth (MYLANTA) oral suspension 30 mL  30 mL Oral Q4H PRN    sodium chloride (NS) flush 5-40 mL  5-40 mL IntraVENous Q8H    sodium chloride (NS) flush 5-40 mL  5-40 mL IntraVENous PRN    acetaminophen (TYLENOL) tablet 650 mg  650 mg Oral Q6H PRN    Or    acetaminophen (TYLENOL) suppository 650 mg  650 mg Rectal Q6H PRN    polyethylene glycol (MIRALAX) packet 17 g  17 g Oral DAILY PRN    ondansetron (ZOFRAN ODT) tablet 4 mg  4 mg Oral Q8H PRN    Or    ondansetron (ZOFRAN) injection 4 mg  4 mg IntraVENous Q6H PRN    ascorbic acid (vitamin C) (VITAMIN C) tablet 500 mg  500 mg Oral BID    albuterol (PROVENTIL HFA, VENTOLIN HFA, PROAIR HFA) inhaler 2 Puff  2 Puff Inhalation Q6H RT    HYDROcodone-homatropine (HYCODAN) 5-1.5 mg/5 mL (5 mL) oral solution 5 mL  5 mL Oral Q4H PRN    loperamide (IMODIUM) capsule 2 mg  2 mg Oral Q4H PRN    hydrALAZINE (APRESOLINE) 20 mg/mL injection 20 mg  20 mg IntraVENous Q6H PRN    zinc sulfate (ZINCATE) 50 mg zinc (220 mg) capsule 1 Capsule  1 Capsule Oral DAILY    hydrocortisone (ANUSOL-HC) 2.5 % rectal cream   PeriANAL QID PRN    diphenhydrAMINE (BENADRYL) capsule 50 mg  50 mg Oral [facility] PRN       Signed:  PIPPA Gutiérrez    Part of this note may have been written by using a voice dictation software. The note has been proof read but may still contain some grammatical/other typographical errors.

## 2021-10-03 NOTE — ROUTINE PROCESS
Shift report given Perla Friedman RN. Patient resting well on 5L HF with no distress noted. Visited his wife in 5 for a couple of hours today. Side rails up x2 with call light within reach.

## 2021-10-03 NOTE — PROGRESS NOTES
Physician Progress Note      PATIENT:               Laura Gomez  CSN #:                  [id number]  :                       1980  ADMIT DATE:       2021 12:00 PM  [address] [place] DATE:  RESPONDING  PROVIDER #:        Fritz Dixon MD          QUERY TEXT:    Pt admitted with COVID-19. Pt noted to have abnormal CXR. If possible, please document in the progress notes and discharge summary if you are evaluating and/or treating any of the following:    Note: CAP and HCAP indicate where the pneumonia was acquired, not a specific type. The medical record reflects the following:  Risk Factors: COVID-19  Clinical Indicators: CXR: \"Multifocal peripheral predominant patchy airspace opacities of the lungs suspicious for atypical  pneumonia. \"  ? Treatment: 02 per HFNC @ 7L, Decadron  Options provided:  -- Bacterial pneumonia  -- Viral pneumonia due to COVID-19  -- Other - I will add my own diagnosis  -- Disagree - Not applicable / Not valid  -- Disagree - Clinically unable to determine / Unknown  -- Refer to Clinical Documentation Reviewer    PROVIDER RESPONSE TEXT:    This patient has viral pneumonia due to COVID-19.     Query created by: Mendoza Johnston on 2021 10:43 AM      Electronically signed by:  Fritz Dixon MD 10/3/2021 10:28 AM

## 2021-10-03 NOTE — PROGRESS NOTES
Assessment completed and patient denies pain or distress. 02 at 8 liters via HFNC with 02 saturation at 93%. Telemetry on and working with NSR. Call light in reach and will continue to assess.

## 2021-10-04 PROCEDURE — 74011250637 HC RX REV CODE- 250/637: Performed by: PHYSICIAN ASSISTANT

## 2021-10-04 PROCEDURE — 94640 AIRWAY INHALATION TREATMENT: CPT

## 2021-10-04 PROCEDURE — 65270000029 HC RM PRIVATE

## 2021-10-04 PROCEDURE — 74011250636 HC RX REV CODE- 250/636: Performed by: FAMILY MEDICINE

## 2021-10-04 PROCEDURE — 97530 THERAPEUTIC ACTIVITIES: CPT

## 2021-10-04 PROCEDURE — 74011250637 HC RX REV CODE- 250/637: Performed by: FAMILY MEDICINE

## 2021-10-04 PROCEDURE — 74011250637 HC RX REV CODE- 250/637: Performed by: NURSE PRACTITIONER

## 2021-10-04 PROCEDURE — 77010033678 HC OXYGEN DAILY

## 2021-10-04 PROCEDURE — 94760 N-INVAS EAR/PLS OXIMETRY 1: CPT

## 2021-10-04 RX ADMIN — FAMOTIDINE 20 MG: 20 TABLET, FILM COATED ORAL at 09:20

## 2021-10-04 RX ADMIN — Medication 10 ML: at 06:05

## 2021-10-04 RX ADMIN — Medication 5 ML: at 21:58

## 2021-10-04 RX ADMIN — ALBUTEROL SULFATE 2 PUFF: 90 AEROSOL, METERED RESPIRATORY (INHALATION) at 07:25

## 2021-10-04 RX ADMIN — Medication 10 ML: at 17:38

## 2021-10-04 RX ADMIN — APIXABAN 10 MG: 5 TABLET, FILM COATED ORAL at 21:49

## 2021-10-04 RX ADMIN — ALBUTEROL SULFATE 2 PUFF: 90 AEROSOL, METERED RESPIRATORY (INHALATION) at 02:09

## 2021-10-04 RX ADMIN — APIXABAN 10 MG: 5 TABLET, FILM COATED ORAL at 09:20

## 2021-10-04 RX ADMIN — DEXAMETHASONE 6 MG: 4 TABLET ORAL at 09:20

## 2021-10-04 RX ADMIN — ALBUTEROL SULFATE 2 PUFF: 90 AEROSOL, METERED RESPIRATORY (INHALATION) at 20:33

## 2021-10-04 RX ADMIN — Medication 1 CAPSULE: at 09:20

## 2021-10-04 RX ADMIN — OXYCODONE HYDROCHLORIDE AND ACETAMINOPHEN 500 MG: 500 TABLET ORAL at 17:38

## 2021-10-04 RX ADMIN — OXYCODONE HYDROCHLORIDE AND ACETAMINOPHEN 500 MG: 500 TABLET ORAL at 09:20

## 2021-10-04 RX ADMIN — FAMOTIDINE 20 MG: 20 TABLET, FILM COATED ORAL at 17:38

## 2021-10-04 NOTE — ROUTINE PROCESS
Shift report given to on coming RN, [place]. Patient is currently on 4 HF in his wifes room in # [id number]. Has been visiting her most of the evening.

## 2021-10-04 NOTE — PROGRESS NOTES
Physician Progress Note      PATIENT:               Marv Pope  CSN #:                  [id number]  :                       1980  ADMIT DATE:       2021 12:00 PM  [address] [place] DATE:  RESPONDING  PROVIDER #:        Mai Rojo MD          QUERY TEXT:    Pt admitted with COVID-19 and noted bilateral PE on CT chest. If possible, please document in progress notes and discharge summary if you are evaluating and/or treating any of the following: The medical record reflects the following:  Risk Factors: immobilization with hospitalization and COVID  Clinical Indicators: CT Chest: \"Extensive bilateral pulmonary emboli as above without evidence of  right heart strain. COVID pneumonia. \"  ? Treatment: Heparin drip, transitioned to eliquis  Options provided:  -- Acute pulmonary embolism  -- Chronic pulmonary embolism  -- Other - I will add my own diagnosis  -- Disagree - Not applicable / Not valid  -- Disagree - Clinically unable to determine / Unknown  -- Refer to Clinical Documentation Reviewer    PROVIDER RESPONSE TEXT:    This patient has acute pulmonary embolism.     Query created by: Edwina Chavez on 10/4/2021 2:09 PM      Electronically signed by:  Mai Rojo MD 10/4/2021 2:32 PM

## 2021-10-04 NOTE — PROGRESS NOTES
Pt is alert and oriented x4, sitting up in chair on 4L HFNC. Pt just returned to room after visiting wife in room 812. Pt tolerated transfer well. RR are even and unlabored, no apparent distress noted. Call light within reach, safety measures in place. Pt instructed to call for help if needed. Will continue to monitor.

## 2021-10-04 NOTE — ROUTINE PROCESS
Shift report received from Perla Friedman RN. Patient sleeping on 5HF with no distress noted. SR up x2, bed low locked and call light within reach.

## 2021-10-04 NOTE — PROGRESS NOTES
Hospitalist Progress Note   Admit Date:  2021 12:00 PM   Name:  Raz Rios   Age:  39 y.o. Sex:  male  :  1980   MRN:  [id number]   Room:  [facility]    Presenting Complaint: Shortness of Breath    Reason(s) for Admission: COVID-19 [U07.1]  Acute respiratory failure [facility]) [J96.00]     Hospital Course & Interval History:   Patient is a 37 y. o. male who presented to the ED for cc worsening SOB after being diagnosed with COVID . Hx of HTN but not on any medications       Subjective (10/04/21):  \"I got to see my wife yesterday( wife hospitalized at [facility] as well); I feel pretty good. \"  Good spirited 41y.o. WM in NAD, admits to having ambulated yesterday with improved recovery    Review of Systems:  10 systems reviewed and negative except as noted in HPI.       Assessment & Plan:     Principal Problem:  Acute Respiratory Failure with COVID-19 Pneumonia (2021)  - cont decadron   - did not receive remdesivir on admission (out of window) and did not warrant DESTINY inhibitor / IL-6 inhibitor   - tolerating supplemental O2 wean, down to 4L hiflow via NC today  - check O2 qualifier today; possibly home next 24-48hrs     Active Problems:  Provoked BLE DVT  - newly dx'd 10/1/21  - cont eliquis  - no gross bleeding, h/h stable    Essential hypertension (2017)  - acceptable ranges without meds     NICOLE (obstructive sleep apnea) (2021)  - Cont CPAP at HS     Transaminitis   - anticipate resolution  - denies GI symptoms  - f/u AM labs            Dispo/Discharge Planning:    - home once medically stable    Diet:  ADULT DIET Regular; Low Fat/Low Chol/High Fiber/2 gm Na  DVT PPx: apixaban  Code status: Full Code    Hospital Problems as of 10/4/2021 Date Reviewed: 2018        Codes Class Noted - Resolved POA    Acute deep vein thrombosis (DVT) of both lower extremities ([facility] [address] ICD-10-CM: I82.403  ICD-9-CM: 453.40  10/1/2021 - Present Unknown        Acute respiratory failure ([facility] [address] ICD-10-CM: J96.00  ICD-9-CM: 518.81  9/28/2021 - Present Unknown        * (Principal) COVID-19 ICD-10-CM: U07.1  ICD-9-CM: 079.89  9/28/2021 - Present Unknown        NICOLE (obstructive sleep apnea) ICD-10-CM: G47.33  ICD-9-CM: 327.23  9/28/2021 - Present Unknown        Essential hypertension ICD-10-CM: I10  ICD-9-CM: 401.9  11/27/2017 - Present Yes              Objective:     Patient Vitals for the past 24 hrs:   Temp Pulse Resp BP SpO2   10/04/21 0813 97.6 °F (36.4 °C) 80 21 122/77 91 %   10/04/21 0725     93 %   10/04/21 0345 98.2 °F (36.8 °C) 75 20 133/85 93 %   10/04/21 0209     95 %   10/03/21 2335 98.5 °F (36.9 °C) 74 19 124/80 95 %   10/03/21 2130     93 %   10/03/21 2012 97.4 °F (36.3 °C) 83 19 133/87 94 %   10/03/21 1759     93 %   10/03/21 1633 97.7 °F (36.5 °C) 84 20 137/86 94 %   10/03/21 1246 97.7 °F (36.5 °C) 83 20 133/85 92 %   10/03/21 1129     91 %     Oxygen Therapy  O2 Sat (%): 91 % (10/04/21 0813)  Pulse via Oximetry: 88 beats per minute (10/04/21 0725)  O2 Device: Nasal cannula (10/04/21 0725)  Skin Assessment: Clean, dry, & intact (10/01/21 0611)  O2 Flow Rate (L/min): 4 l/min (10/04/21 0725)    Estimated body mass index is 32.25 kg/m² as calculated from the following:    Height as of this encounter: 6' 3\" (1.905 m). Weight as of this encounter: 117 kg (258 lb). No intake or output data in the 24 hours ending 10/04/21 1100      Physical Exam:     General:    Well nourished. No overt distress  Head:  Normocephalic, atraumatic  Eyes:  Sclerae appear normal.  Pupils equally round. ENT:  Nares appear normal, no drainage. Moist oral mucosa  Neck:  No restricted ROM. Trachea midline   CV:   RRR. No m/r/g. No jugular venous distension. Lungs:   CTAB. No wheezing, rhonchi, or rales. Respirations even, unlabored  Abdomen: Bowel sounds present. Soft, nontender, nondistended. Extremities: No cyanosis or clubbing.   No edema, calf tenderness b/l on palpation  Skin:     No rashes and normal coloration. Warm and dry. Neuro:  Cranial nerves II-XII grossly intact. A&Ox3  Psych:  Normal mood and affect. I have reviewed ordered lab tests and independently visualized imaging below:    Last 24hr Labs:  No results found for this or any previous visit (from the past 24 hour(s)). All Micro Results     Procedure Component Value Units Date/Time    COVID-19 RAPID TEST [[id number]]  (Abnormal) Collected: 09/28/21 1341    Order Status: Completed Specimen: Nasopharyngeal Updated: 09/28/21 1351     Specimen source NASAL        COVID-19 rapid test Detected        Comment:      The specimen is POSITIVE for SARS-CoV-2, the novel coronavirus associated with COVID-19. This test has been authorized by the FDA under an Emergency Use Authorization (EUA) for use by authorized laboratories. Fact sheet for Healthcare Providers: ConventionUpdate.co.nz  Fact sheet for Patients: ConventionUpdate.co.nz       Methodology: Isothermal Nucleic Acid Amplification               Other Studies:  No results found.     Current Meds:  Current Facility-Administered Medications   Medication Dose Route Frequency    influenza vaccine 2021-22 (6 mos+)(PF) (FLUARIX/FLULAVAL/FLUZONE QUAD) injection 0.5 mL  1 Each IntraMUSCular PRIOR TO DISCHARGE    apixaban (ELIQUIS) tablet 10 mg  10 mg Oral BID    dexAMETHasone (DECADRON) tablet 6 mg  6 mg Oral DAILY    famotidine (PEPCID) tablet 20 mg  20 mg Oral BID    alum-mag hydroxide-simeth (MYLANTA) oral suspension 30 mL  30 mL Oral Q4H PRN    sodium chloride (NS) flush 5-40 mL  5-40 mL IntraVENous Q8H    sodium chloride (NS) flush 5-40 mL  5-40 mL IntraVENous PRN    acetaminophen (TYLENOL) tablet 650 mg  650 mg Oral Q6H PRN    Or    acetaminophen (TYLENOL) suppository 650 mg  650 mg Rectal Q6H PRN    polyethylene glycol (MIRALAX) packet 17 g  17 g Oral DAILY PRN    ondansetron (ZOFRAN ODT) tablet 4 mg  4 mg Oral Q8H PRN    Or    ondansetron (ZOFRAN) injection 4 mg  4 mg IntraVENous Q6H PRN    ascorbic acid (vitamin C) (VITAMIN C) tablet 500 mg  500 mg Oral BID    albuterol (PROVENTIL HFA, VENTOLIN HFA, PROAIR HFA) inhaler 2 Puff  2 Puff Inhalation Q6H RT    HYDROcodone-homatropine (HYCODAN) 5-1.5 mg/5 mL (5 mL) oral solution 5 mL  5 mL Oral Q4H PRN    loperamide (IMODIUM) capsule 2 mg  2 mg Oral Q4H PRN    hydrALAZINE (APRESOLINE) 20 mg/mL injection 20 mg  20 mg IntraVENous Q6H PRN    zinc sulfate (ZINCATE) 50 mg zinc (220 mg) capsule 1 Capsule  1 Capsule Oral DAILY    hydrocortisone (ANUSOL-HC) 2.5 % rectal cream   PeriANAL QID PRN    diphenhydrAMINE (BENADRYL) capsule 50 mg  50 mg Oral QHS PRN       Signed:  PIPPA Olivarez    Part of this note may have been written by using a voice dictation software. The note has been proof read but may still contain some grammatical/other typographical errors.

## 2021-10-05 ENCOUNTER — HOME HEALTH ADMISSION (OUTPATIENT)
Dept: HOME HEALTH SERVICES | Facility: HOME HEALTH | Age: 41
End: 2021-10-05
Payer: COMMERCIAL

## 2021-10-05 ENCOUNTER — APPOINTMENT (OUTPATIENT)
Dept: GENERAL RADIOLOGY | Age: 41
DRG: 177 | End: 2021-10-05
Payer: COMMERCIAL

## 2021-10-05 VITALS
TEMPERATURE: 97.9 F | OXYGEN SATURATION: 93 % | HEIGHT: 75 IN | DIASTOLIC BLOOD PRESSURE: 80 MMHG | RESPIRATION RATE: 20 BRPM | HEART RATE: 78 BPM | BODY MASS INDEX: 32.02 KG/M2 | SYSTOLIC BLOOD PRESSURE: 124 MMHG | WEIGHT: 257.5 LBS

## 2021-10-05 LAB
ALBUMIN SERPL-MCNC: 2.7 G/DL (ref 3.5–5)
ALBUMIN/GLOB SERPL: 0.6 {RATIO} (ref 1.2–3.5)
ALP SERPL-CCNC: 64 U/L (ref 50–136)
ALT SERPL-CCNC: 131 U/L (ref 12–65)
ANION GAP SERPL CALC-SCNC: 8 MMOL/L (ref 7–16)
AST SERPL-CCNC: 23 U/L (ref 15–37)
BASOPHILS # BLD: 0.1 K/UL (ref 0–0.2)
BASOPHILS NFR BLD: 1 % (ref 0–2)
BILIRUB SERPL-MCNC: 0.7 MG/DL (ref 0.2–1.1)
BUN SERPL-MCNC: 21 MG/DL (ref 6–23)
CALCIUM SERPL-MCNC: 9.1 MG/DL (ref 8.3–10.4)
CHLORIDE SERPL-SCNC: 101 MMOL/L (ref 98–107)
CO2 SERPL-SCNC: 24 MMOL/L (ref 21–32)
CREAT SERPL-MCNC: 0.9 MG/DL (ref 0.8–1.5)
DIFFERENTIAL METHOD BLD: ABNORMAL
EOSINOPHIL # BLD: 0.1 K/UL (ref 0–0.8)
EOSINOPHIL NFR BLD: 1 % (ref 0.5–7.8)
ERYTHROCYTE [DISTWIDTH] IN BLOOD BY AUTOMATED COUNT: 12 % (ref 11.9–14.6)
GLOBULIN SER CALC-MCNC: 4.8 G/DL (ref 2.3–3.5)
GLUCOSE SERPL-MCNC: 85 MG/DL (ref 65–100)
HCT VFR BLD AUTO: 42.3 % (ref 41.1–50.3)
HGB BLD-MCNC: 14.6 G/DL (ref 13.6–17.2)
IMM GRANULOCYTES # BLD AUTO: 0.6 K/UL (ref 0–0.5)
IMM GRANULOCYTES NFR BLD AUTO: 4 % (ref 0–5)
LYMPHOCYTES # BLD: 1.9 K/UL (ref 0.5–4.6)
LYMPHOCYTES NFR BLD: 13 % (ref 13–44)
MCH RBC QN AUTO: 31.6 PG (ref 26.1–32.9)
MCHC RBC AUTO-ENTMCNC: 34.5 G/DL (ref 31.4–35)
MCV RBC AUTO: 91.6 FL (ref 79.6–97.8)
MONOCYTES # BLD: 2 K/UL (ref 0.1–1.3)
MONOCYTES NFR BLD: 13 % (ref 4–12)
NEUTS SEG # BLD: 10.1 K/UL (ref 1.7–8.2)
NEUTS SEG NFR BLD: 68 % (ref 43–78)
NRBC # BLD: 0 K/UL (ref 0–0.2)
PLATELET # BLD AUTO: 183 K/UL (ref 150–450)
PMV BLD AUTO: 10 FL (ref 9.4–12.3)
POTASSIUM SERPL-SCNC: 4.2 MMOL/L (ref 3.5–5.1)
PROT SERPL-MCNC: 7.5 G/DL (ref 6.3–8.2)
RBC # BLD AUTO: 4.62 M/UL (ref 4.23–5.6)
SODIUM SERPL-SCNC: 133 MMOL/L (ref 138–145)
WBC # BLD AUTO: 14.8 K/UL (ref 4.3–11.1)

## 2021-10-05 PROCEDURE — 80053 COMPREHEN METABOLIC PANEL: CPT

## 2021-10-05 PROCEDURE — 94640 AIRWAY INHALATION TREATMENT: CPT

## 2021-10-05 PROCEDURE — 71045 X-RAY EXAM CHEST 1 VIEW: CPT

## 2021-10-05 PROCEDURE — 74011250636 HC RX REV CODE- 250/636: Performed by: FAMILY MEDICINE

## 2021-10-05 PROCEDURE — 74011250637 HC RX REV CODE- 250/637: Performed by: PHYSICIAN ASSISTANT

## 2021-10-05 PROCEDURE — 94761 N-INVAS EAR/PLS OXIMETRY MLT: CPT

## 2021-10-05 PROCEDURE — 77010033711 HC HIGH FLOW OXYGEN

## 2021-10-05 PROCEDURE — 94760 N-INVAS EAR/PLS OXIMETRY 1: CPT

## 2021-10-05 PROCEDURE — 36415 COLL VENOUS BLD VENIPUNCTURE: CPT

## 2021-10-05 PROCEDURE — 74011250637 HC RX REV CODE- 250/637: Performed by: FAMILY MEDICINE

## 2021-10-05 PROCEDURE — 74011250637 HC RX REV CODE- 250/637: Performed by: NURSE PRACTITIONER

## 2021-10-05 PROCEDURE — 85025 COMPLETE CBC W/AUTO DIFF WBC: CPT

## 2021-10-05 RX ORDER — DEXAMETHASONE 6 MG/1
6 TABLET ORAL
Qty: 7 TABLET | Refills: 0 | Status: SHIPPED | OUTPATIENT
Start: 2021-10-05 | End: 2021-10-12

## 2021-10-05 RX ORDER — ALBUTEROL SULFATE 90 UG/1
2 AEROSOL, METERED RESPIRATORY (INHALATION)
Qty: 18 G | Refills: 0 | Status: SHIPPED | OUTPATIENT
Start: 2021-10-05

## 2021-10-05 RX ADMIN — Medication 10 ML: at 05:37

## 2021-10-05 RX ADMIN — Medication 1 CAPSULE: at 08:32

## 2021-10-05 RX ADMIN — ACETAMINOPHEN 650 MG: 325 TABLET ORAL at 03:07

## 2021-10-05 RX ADMIN — FAMOTIDINE 20 MG: 20 TABLET, FILM COATED ORAL at 08:31

## 2021-10-05 RX ADMIN — ALBUTEROL SULFATE 2 PUFF: 90 AEROSOL, METERED RESPIRATORY (INHALATION) at 09:05

## 2021-10-05 RX ADMIN — APIXABAN 10 MG: 5 TABLET, FILM COATED ORAL at 08:31

## 2021-10-05 RX ADMIN — OXYCODONE HYDROCHLORIDE AND ACETAMINOPHEN 500 MG: 500 TABLET ORAL at 08:31

## 2021-10-05 RX ADMIN — DEXAMETHASONE 6 MG: 4 TABLET ORAL at 08:31

## 2021-10-05 RX ADMIN — ALBUTEROL SULFATE 2 PUFF: 90 AEROSOL, METERED RESPIRATORY (INHALATION) at 02:07

## 2021-10-05 NOTE — PROGRESS NOTES
Care Management Interventions  PCP Verified by CM: Yes  Transition of Care Consult (CM Consult): [address]: Yes  Physical Therapy Consult: Yes  Occupational Therapy Consult: Yes  Speech Therapy Consult: No  Support Systems: Spouse/Significant Other  Confirm Follow Up Transport: Self  The Plan for Transition of Care is Related to the Following Treatment Goals : HH  The Patient and/or Patient Representative was Provided with a Choice of Provider and Agrees with the Discharge Plan?: Yes  Name of the Patient Representative Who was Provided with a Choice of Provider and Agrees with the Discharge Plan: patient  Freedom of Choice List was Provided with Basic Dialogue that Supports the Patient's Individualized Plan of Care/Goals, Treatment Preferences and Shares the Quality Data Associated with the Providers?: Yes   Resource Information Provided?: No  Discharge Location  Discharge Placement: Home with home health  Patient is discharging home with [facility] [place] RN and [address]. CM remains available.

## 2021-10-05 NOTE — PROGRESS NOTES
Discharge instructions reviewed with pt, pt verbalized understanding of all instructions and home medications. All prescriptions sent home with patient. Pt sent home with all personal belongings. Pt sent home on with portable tank on [facility] with [facility] to supply home oxygen needs. Pt v/u.  pt to f/u with PCP, visit scheduled. Pt d/c with [facility] nursing. No issues, questions or concerns verbalized at this time.

## 2021-10-05 NOTE — PROGRESS NOTES
Problem: Airway Clearance - Ineffective  Goal: Achieve or maintain patent airway  Outcome: Progressing Towards Goal     Problem: Gas Exchange - Impaired  Goal: Promote optimal lung function  Outcome: Progressing Towards Goal

## 2021-10-05 NOTE — PROGRESS NOTES
No acute events overnight. Pt is alert and oriented x4, resting comfortably in bed with eyes closed on 5L HFNC. RR are even and unlabored, no apparent distress noted. Pt c/o shoulder pain throughout the night, Acetaminophen PRN given and pain subsided. Safety measures in place, call light within reach. Will prepare for bedside shift report to oncoming RN.

## 2021-10-05 NOTE — DISCHARGE INSTRUCTIONS
DISCHARGE SUMMARY from Nurse    PATIENT INSTRUCTIONS:    After general anesthesia or intravenous sedation, for 24 hours or while taking prescription Narcotics:  · Limit your activities  · Do not drive and operate hazardous machinery  · Do not make important personal or business decisions  · Do  not drink alcoholic beverages  · If you have not urinated within 8 hours after discharge, please contact your surgeon on call. What to do at Home:  Recommended activity: Activity as tolerated. If you experience any of the following symptoms worsening cough or wheezing, shortness of breath or fatigue not relieved with rest, unrelieved pain, nausea or vomiting please follow up with MD.    *  Please give a list of your current medications to your Primary Care Provider. *  Please update this list whenever your medications are discontinued, doses are      changed, or new medications (including over-the-counter products) are added. *  Please carry medication information at all times in case of emergency situations. These are general instructions for a healthy lifestyle:    No smoking/ No tobacco products/ Avoid exposure to second hand smoke  Surgeon General's Warning:  Quitting smoking now greatly reduces serious risk to your health. Obesity, smoking, and sedentary lifestyle greatly increases your risk for illness    A healthy diet, regular physical exercise & weight monitoring are important for maintaining a healthy lifestyle    You may be retaining fluid if you have a history of heart failure or if you experience any of the following symptoms:  Weight gain of 3 pounds or more overnight or 5 pounds in a week, increased swelling in our hands or feet or shortness of breath while lying flat in bed. Please call your doctor as soon as you notice any of these symptoms; do not wait until your next office visit. The discharge information has been reviewed with the patient.   The patient verbalized understanding. Discharge medications reviewed with the patient and appropriate educational materials and side effects teaching were provided. Advance Care Planning  People with COVID-19 may have no symptoms, mild symptoms, such as fever, cough, and shortness of breath or they may have more severe illness, developing severe and fatal pneumonia. As a result, Advance Care Planning with attention to naming a health care decision maker (someone you trust to make healthcare decisions for you if you could not speak for yourself) and sharing other health care preferences is important BEFORE a possible health crisis. Please contact your Primary Care Provider to discuss Advance Care Planning. Preventing the Spread of Coronavirus Disease 2019 in Homes and Residential Communities  For the most recent information go to [email or web address]    Prevention steps for People with confirmed or suspected COVID-19 (including persons under investigation) who do not need to be hospitalized  and   People with confirmed COVID-19 who were hospitalized and determined to be medically stable to go home    Your healthcare provider and public health staff will evaluate whether you can be cared for at home. If it is determined that you do not need to be hospitalized and can be isolated at home, you will be monitored by staff from your local or state health department. You should follow the prevention steps below until a healthcare provider or local or state health department says you can return to your normal activities. Stay home except to get medical care  People who are mildly ill with COVID-19 are able to isolate at home during their illness. You should restrict activities outside your home, except for getting medical care. Do not go to work, school, or public areas. Avoid using public transportation, ride-sharing, or taxis.   Separate yourself from other people and animals in your home  People: As much as possible, you should stay in a specific room and away from other people in your home. Also, you should use a separate bathroom, if available. Animals: You should restrict contact with pets and other animals while you are sick with COVID-19, just like you would around other people. Although there have not been reports of pets or other animals becoming sick with COVID-19, it is still recommended that people sick with COVID-19 limit contact with animals until more information is known about the virus. When possible, have another member of your household care for your animals while you are sick. If you are sick with COVID-19, avoid contact with your pet, including petting, snuggling, being kissed or licked, and sharing food. If you must care for your pet or be around animals while you are sick, wash your hands before and after you interact with pets and wear a facemask. Call ahead before visiting your doctor  If you have a medical appointment, call the healthcare provider and tell them that you have or may have COVID-19. This will help the healthcare providers office take steps to keep other people from getting infected or exposed. Wear a facemask  You should wear a facemask when you are around other people (e.g., sharing a room or vehicle) or pets and before you enter a healthcare providers office. If you are not able to wear a facemask (for example, because it causes trouble breathing), then people who live with you should not stay in the same room with you, or they should wear a facemask if they enter your room. Cover your coughs and sneezes  Cover your mouth and nose with a tissue when you cough or sneeze. Throw used tissues in a lined trash can. Immediately wash your hands with soap and water for at least 20 seconds or, if soap and water are not available, clean your hands with an alcohol-based hand  that contains at least 60% alcohol.   Clean your hands often  Wash your hands often with soap and water for at least 20 seconds, especially after blowing your nose, coughing, or sneezing; going to the bathroom; and before eating or preparing food. If soap and water are not readily available, use an alcohol-based hand  with at least 60% alcohol, covering all surfaces of your hands and rubbing them together until they feel dry. Soap and water are the best option if hands are visibly dirty. Avoid touching your eyes, nose, and mouth with unwashed hands. Avoid sharing personal household items  You should not share dishes, drinking glasses, cups, eating utensils, towels, or bedding with other people or pets in your home. After using these items, they should be washed thoroughly with soap and water. Clean all high-touch surfaces everyday  High touch surfaces include counters, tabletops, doorknobs, bathroom fixtures, toilets, phones, keyboards, tablets, and bedside tables. Also, clean any surfaces that may have blood, stool, or body fluids on them. Use a household cleaning spray or wipe, according to the label instructions. Labels contain instructions for safe and effective use of the cleaning product including precautions you should take when applying the product, such as wearing gloves and making sure you have good ventilation during use of the product. Monitor your symptoms  Seek prompt medical attention if your illness is worsening (e.g., difficulty breathing). Before seeking care, call your healthcare provider and tell them that you have, or are being evaluated for, COVID-19. Put on a facemask before you enter the facility. These steps will help the healthcare providers office to keep other people in the office or waiting room from getting infected or exposed. Ask your healthcare provider to call the local or [facility] health department.  Persons who are placed under active monitoring or facilitated self-monitoring should follow instructions provided by their local health department or occupational health professionals, as appropriate. When working with your local health department check their available hours. If you have a medical emergency and need to call 911, notify the dispatch personnel that you have, or are being evaluated for COVID-19. If possible, put on a facemask before emergency medical services arrive. Discontinuing home isolation  Patients with confirmed COVID-19 should remain under home isolation precautions until the risk of secondary transmission to others is thought to be low. The decision to discontinue home isolation precautions should be made on a case-by-case basis, in consultation with healthcare providers and state and local health departments. Patient Education        Pneumonia: Care Instructions  Overview     Pneumonia is an infection of the lungs. Most cases are caused by infections from bacteria or viruses. Pneumonia may be mild or very severe. If it is caused by bacteria, you will be treated with antibiotics. It may take a few weeks to a few months to recover fully from pneumonia, depending on how sick you were and whether your overall health is good. Follow-up care is a key part of your treatment and safety. Be sure to make and go to all appointments, and call your doctor if you are having problems. It's also a good idea to know your test results and keep a list of the medicines you take. How can you care for yourself at home? · Take your antibiotics exactly as directed. Do not stop taking the medicine just because you are feeling better. You need to take the full course of antibiotics. · Take your medicines exactly as prescribed. Call your doctor if you think you are having a problem with your medicine. · Get plenty of rest and sleep. You may feel weak and tired for a while, but your energy level will improve with time. · To prevent dehydration, drink plenty of fluids. Choose water and other clear liquids.  If you have kidney, heart, or liver disease and have to limit fluids, talk with your doctor before you increase the amount of fluids you drink. · Take care of your cough so you can rest. A cough that brings up mucus from your lungs is common with pneumonia. It is one way your body gets rid of the infection. But if coughing keeps you from resting or causes severe fatigue and chest-wall pain, talk to your doctor. Your doctor may suggest that you take a medicine to reduce the cough. · Use a vaporizer or humidifier to add moisture to your bedroom. Follow the directions for cleaning the machine. · Do not smoke or allow others to smoke around you. Smoke will make your cough last longer. If you need help quitting, talk to your doctor about stop-smoking programs and medicines. These can increase your chances of quitting for good. · Take an over-the-counter pain medicine, such as acetaminophen (Tylenol), ibuprofen (Advil, Motrin), or naproxen (Aleve). Read and follow all instructions on the label. · Do not take two or more pain medicines at the same time unless the doctor told you to. Many pain medicines have acetaminophen, which is Tylenol. Too much acetaminophen (Tylenol) can be harmful. · If you were given a spirometer to measure how well your lungs are working, use it as instructed. This can help your doctor tell how your recovery is going. · To prevent pneumonia in the future, talk to your doctor about getting a flu vaccine (once a year) and a pneumococcal vaccine (one time only for most people). When should you call for help? Call 911 anytime you think you may need emergency care. For example, call if:    · You have severe trouble breathing. Call your doctor now or seek immediate medical care if:    · You cough up dark brown or bloody mucus (sputum).     · You have new or worse trouble breathing.     · You are dizzy or lightheaded, or you feel like you may faint.    Watch closely for changes in your health, and be sure to contact your doctor if:    · You have a new or higher fever.     · You are coughing more deeply or more often.     · You are not getting better after 2 days (48 hours).     · You do not get better as expected. Where can you learn more? Go to http://www.[employer]/  Enter D336 in the search box to learn more about \"Pneumonia: Care Instructions. \"  Current as of: July 6, 2021               Content Version: 13.0  © 2006-2021 [employer]. Care instructions adapted under license by [employer] (which disclaims liability or warranty for this information). If you have questions about a medical condition or this instruction, always ask your healthcare professional. [address] any warranty or liability for your use of this information.            ___________________________________________________________________________________________________________________________________

## 2021-10-05 NOTE — DISCHARGE SUMMARY
Hospitalist Discharge Summary   Admit Date:  2021 12:00 PM   DC Note date: 10/5/2021  Name:  Terri Franco   Age:  39 y.o. Sex:  male  :  1980   MRN:  [id number]   Room:  [facility]  PCP:  Rik Blount MD    Presenting Complaint: Shortness of Breath    Initial Admission Diagnosis: COVID-19 [U07.1]  Acute respiratory failure ([address] [J96.00]     Problem List for this Hospitalization:  Hospital Problems as of 10/5/2021 Date Reviewed: 2018        Codes Class Noted - Resolved POA    Acute deep vein thrombosis (DVT) of both lower extremities ([address] ICD-10-CM: I82.403  ICD-9-CM: 453.40  10/1/2021 - Present Unknown        Acute respiratory failure ([facility] ICD-10-CM: J96.00  ICD-9-CM: 518.81  2021 - Present Unknown        * (Principal) COVID-19 ICD-10-CM: U07.1  ICD-9-CM: 079.89  2021 - Present Unknown        NICOLE (obstructive sleep apnea) ICD-10-CM: S70.79  ICD-9-CM: 327.23  2021 - Present Unknown        Essential hypertension ICD-10-CM: I10  ICD-9-CM: 401.9  2017 - Present Yes            Did Patient have Sepsis (YES OR NO): No      Hospital Course:  Patient is a 37 y. o. male who presented to the ED for cc worsening SOB after being diagnosed with COVID . Hx of HTN but not on any medications. He is not vaccinated for COVID 19. Patient was initially placed on 4L supplemental O2 as his O2 saturations were 70% on RA. Cxray did reveal b/l lung opacities suspicious for atypical PNA, consistent with his known COVID 19 infection. Pt was started on decadron 6mg daily but did not warrant baricitinib as his supplemental O2 requirements did not significantly increased. On 10/1/2021 pt developed b/l LE tenderness and venous doppler revealed acute b/l calf DVT. Ct chest thereafter revealed extensive b/l PEs, no right heart strain. Pt was started on full dose SC (Hep gtt which transitioned to apixaban thereafter).   Pt's h/h remained stable and no complications occurred from initiating full dose AC. Pt's supplemental O2 requirements was weaned from 11L hifow ~>9L hiflow ~>5L hiflow on subsequent hospital days. Today pt did ambulate with PT and post ambulation, qualified for supplemental O2 (90% 2L at rest, 89% 4L on ambulation). Upon my exam of Mr. Mortensen this AM, he did admit to me some tenderness on inspiration to right lateral chest, NOT reproducible on palpation. Cxray this AM DID NOT reveal acute PTX / pneumomediastinum (images as below). He is medically stable for discharge home today, with supplemental O2. I instructed Mr. Mortensen to f/u with his PCP in one week and will defer to PCP continued weaning off supplemental O2. Disposition: Home or Self Care  Diet: ADULT DIET Regular; Low Fat/Low Chol/High Fiber/2 gm Na  Code Status: Full Code    Follow Up Orders: Follow-up Appointments   Procedures    FOLLOW UP VISIT Appointment in: One Week PCP     PCP     Standing Status:   Standing     Number of Occurrences:   1     Order Specific Question:   Appointment in     Answer: One Week       Follow-up Information     Follow up With Specialties Details Why Contact Info    Disha Green MD Family Medicine   [address] [place] [address] Rd  [phone]            Suggested initial follow up labs/diagnostics (ultimately defer to outpatient provider):  Tiffany Borges / MICHAEL    Time spent in patient discharge and coordination 40 minutes. Plan was discussed with patient, RN, case mgmt. All questions answered. Patient was stable at time of discharge. Instructions given to call a physician or return if any concerns. Discharge Info:   Current Discharge Medication List      START taking these medications    Details   albuterol (PROVENTIL HFA, VENTOLIN HFA, PROAIR HFA) 90 mcg/actuation inhaler Take 2 Puffs by inhalation every six (6) hours as needed for Wheezing.   Qty: 18 g, Refills: 0  Start date: 10/5/2021      !! apixaban (ELIQUIS) 5 mg tablet Take 2 Tablets by mouth two (2) times a day for 8 doses. Qty: 16 Tablet, Refills: 0  Start date: 10/5/2021, End date: 10/9/2021      !! apixaban (ELIQUIS) 5 mg tablet Take 1 Tablet by mouth two (2) times a day for 60 days. Indications: a clot in the lung  Qty: 60 Tablet, Refills: 1  Start date: 10/5/2021, End date: 12/4/2021    Comments: Dose to start 10/9/2021 evening AFTER finish course of \"apixaban 10mg bid x 7 days\"      dexAMETHasone (DECADRON) 6 mg tablet Take 1 Tablet by mouth Daily (before breakfast) for 7 days. Qty: 7 Tablet, Refills: 0  Start date: 10/5/2021, End date: 10/12/2021       !! - Potential duplicate medications found. Please discuss with provider. Procedures done this admission:  * No surgery found *    Consults this admission:  None    Echocardiogram/EKG results:  No results found for this or any previous visit. EKG Results     Procedure 720 Value Units Date/Time    EKG 12 LEAD INITIAL [[id number]] Collected: 09/28/21 1212    Order Status: Completed Updated: 09/28/21 1353     Ventricular Rate 70 BPM      Atrial Rate 70 BPM      P-R Interval 176 ms      QRS Duration 92 ms      Q-T Interval 400 ms      QTC Calculation (Bezet) 432 ms      Calculated P Axis 44 degrees      Calculated R Axis -13 degrees      Diagnosis --     Normal sinus rhythm  Nonspecific T wave abnormality  Abnormal ECG  No previous ECGs available  Confirmed by Mikal Ramon ([id number]) on 9/28/2021 1:53:34 PM            Diagnostic Imaging/Tests:   XR CHEST SNGL V    Result Date: 10/5/2021  PORTABLE CHEST X-RAY 10/5/2021 10:30 AM HISTORY: covid ; right lateral pain COMPARISON: 9/28/2021 FINDINGS: The lung volumes are diminished. Patchy peripheral bibasilar infiltrates are present. Pleural spaces are clear. Low lung volumes with bilateral infiltrates. XR CHEST PORT    Result Date: 9/28/2021  EXAM: Single view chest radiograph. INDICATION: Dyspnea. COMPARISON: Chest radiograph dated May 23, 2018.  FINDINGS: Multifocal peripheral predominant patchy airspace opacities of the lungs. No pneumothorax or pleural effusion. Heart is normal in size. 1. Multifocal peripheral predominant patchy airspace opacities of the lungs suspicious for atypical pneumonia. DUPLEX LOWER EXT VENOUS BILAT    Result Date: 10/1/2021  RIGHT LOWER EXTREMITY VENOUS DUPLEX ULTRASOUND. INDICATION: Bilateral calf pain. TECHNIQUE: Direct skin-contact high resolution grayscale images, color-flow Doppler and duplex waveform analysis. FINDINGS: There is compressibility, color-flow assignment and augmentation of the venous waveform with calf compression in the common femoral, superficial femoral and popliteal veins. Upper calf veins appear enlarged with abnormal intraluminal echoes in the posterior tibial and peroneal veins. Positive for deep venous thrombosis right lower extremity calf veins. LEFT LOWER EXTREMITY VENOUS DUPLEX ULTRASOUND. INDICATION: Left calf pain. TECHNIQUE: Direct skin-contact high resolution grayscale images, color-flow Doppler and duplex waveform analysis. FINDINGS: There is compressibility, color-flow assignment and augmentation of the venous waveform with calf compression in the common femoral, superficial femoral and popliteal veins. Upper calf veins demonstrate abnormal intraluminal echoes within the left posterior tibial veins. IMPRESSION: Positive for sonographic evidence of deep venous thrombosis left lower extremity calf vein. CT CHEST PULMONARY EMBOLISM    Result Date: 10/1/2021  CT OF THE CHEST WITH INTRAVENOUS CONTRAST. INDICATION: Shortness of breath. COVID positive. Bilateral acute lower extremity deep venous thrombosis. COMPARISON: None. TECHNIQUE:   2.5 mm axial scans from above the aortic arch to the lung bases with 100 cc nonionic intravenous contrast without acute complication. Intravenous contrast was given to evaluate for pulmonary embolism. Radiation dose reduction techniques were used for this study.   Our CT scanners use one or more of the following:  Automated exposure control, adjustment of the mA and or kV according to patient size, iterative reconstruction. FINDINGS:  PULMONARY ARTERY OPACIFICATION: Adequate. There is a large intraluminal filling defect in the right pulmonary artery; also filling defects in the right upper lobe, right middle lobe and right lower lobe segmental and subsegmental arteries and also left lower lobe subsegmental pulmonary arteries. LUNGS: Diffuse bilateral patchy infiltrates consistent COVID pneumonia. AIRWAYS: Trachea and proximal bronchi grossly patent. PLEURA: No effusion or thickening or calcifications. LYMPH NODES: No enlarged axillary, hilar or mediastinal lymph nodes. HEART: Normal size. Normal. Right heart size normal. AORTA is normal caliber with a uniform lumen without evidence of dissection. UPPER ABDOMEN: Normal size adrenal glands although incompletely imaged. . SKELETAL/CHEST WALL: DJD, otherwise no gross bony lesions. Extensive bilateral pulmonary emboli as above without evidence of right heart strain. COVID pneumonia. CRITICAL RESULT:  Results will be called to the nurse or physician at the nursing unit by the CT technologist at time of reporting. All Micro Results     Procedure Component Value Units Date/Time    COVID-19 RAPID TEST [[id number]]  (Abnormal) Collected: 09/28/21 1341    Order Status: Completed Specimen: Nasopharyngeal Updated: 09/28/21 1354     Specimen source NASAL        COVID-19 rapid test Detected        Comment:      The specimen is POSITIVE for SARS-CoV-2, the novel coronavirus associated with COVID-19. This test has been authorized by the FDA under an Emergency Use Authorization (EUA) for use by authorized laboratories.         Fact sheet for Healthcare Providers: ConventionUpdate.co.nz  Fact sheet for Patients: ConventionUpdate.co.nz       Methodology: Isothermal Nucleic Acid Amplification Labs: Results:       BMP, Mg, Phos Recent Labs     10/03/21  0727      K 4.6      CO2 26   AGAP 8   BUN 20   CREA 0.83   CA 9.1   GLU 91      CBC Recent Labs     10/03/21  0727   WBC 14.2*   RBC 4.75   HGB 14.9   HCT 42.7   *   GRANS 75   LYMPH 10*   EOS 1   MONOS 9   BASOS 1   IG 5   ANEU 10.7*   ABL 1.4   CAIN 0.1   ABM 1.3   ABB 0.1   AIG 0.7*      LFT Recent Labs     10/03/21  0727   *   AP 62   TP 7.3   ALB 2.7*   GLOB 4.6*   AGRAT 0.6*      Cardiac Testing No results found for: BNPP, BNP, CPK, RCK1, RCK2, RCK3, RCK4, CKMB, CKNDX, CKND1, TROPT, TROIQ   Coagulation Tests No results found for: PTP, INR, APTT, INREXT   A1c No results found for: HBA1C, LLY2YZFE   Lipid Panel Lab Results   Component Value Date/Time    Cholesterol, total 171 05/09/2018 10:33 AM    HDL Cholesterol 45 05/09/2018 10:33 AM    LDL, calculated 99 05/09/2018 10:33 AM    VLDL, calculated 27 05/09/2018 10:33 AM    Triglyceride 135 05/09/2018 10:33 AM      Thyroid Panel Lab Results   Component Value Date/Time    TSH 2.490 11/27/2017 05:23 PM        Most Recent UA No results found for: COLOR, APPRN, REFSG, WILLIAM, PROTU, GLUCU, KETU, BILU, BLDU, UROU, BURT, LEUKU, WBCU, RBCU, UEPI, BACTU, CASTS, UCRY, MUCUS, UCOM       All Labs from Last 24 Hrs:  No results found for this or any previous visit (from the past 24 hour(s)).     Current Med List in Hospital:   Current Facility-Administered Medications   Medication Dose Route Frequency    influenza vaccine 2021-22 (6 mos+)(PF) (FLUARIX/FLULAVAL/FLUZONE QUAD) injection 0.5 mL  1 Each IntraMUSCular PRIOR TO DISCHARGE    apixaban (ELIQUIS) tablet 10 mg  10 mg Oral BID    dexAMETHasone (DECADRON) tablet 6 mg  6 mg Oral DAILY    famotidine (PEPCID) tablet 20 mg  20 mg Oral BID    alum-mag hydroxide-simeth (MYLANTA) oral suspension 30 mL  30 mL Oral Q4H PRN    sodium chloride (NS) flush 5-40 mL  5-40 mL IntraVENous Q8H    sodium chloride (NS) flush 5-40 mL  5-40 mL IntraVENous PRN    acetaminophen (TYLENOL) tablet 650 mg  650 mg Oral Q6H PRN    Or    acetaminophen (TYLENOL) suppository 650 mg  650 mg Rectal Q6H PRN    polyethylene glycol (MIRALAX) packet 17 g  17 g Oral DAILY PRN    ondansetron (ZOFRAN ODT) tablet 4 mg  4 mg Oral Q8H PRN    Or    ondansetron (ZOFRAN) injection 4 mg  4 mg IntraVENous Q6H PRN    ascorbic acid (vitamin C) (VITAMIN C) tablet 500 mg  500 mg Oral BID    albuterol (PROVENTIL HFA, VENTOLIN HFA, PROAIR HFA) inhaler 2 Puff  2 Puff Inhalation Q6H RT    HYDROcodone-homatropine (HYCODAN) 5-1.5 mg/5 mL (5 mL) oral solution 5 mL  5 mL Oral Q4H PRN    loperamide (IMODIUM) capsule 2 mg  2 mg Oral Q4H PRN    hydrALAZINE (APRESOLINE) 20 mg/mL injection 20 mg  20 mg IntraVENous Q6H PRN    zinc sulfate (ZINCATE) 50 mg zinc (220 mg) capsule 1 Capsule  1 Capsule Oral DAILY    hydrocortisone (ANUSOL-HC) 2.5 % rectal cream   PeriANAL QID PRN    diphenhydrAMINE (BENADRYL) capsule 50 mg  50 mg Oral QHS PRN       Allergies   Allergen Reactions    Shellfish Derived Nausea and Vomiting     There is no immunization history for the selected administration types on file for this patient.     Recent Vital Data:  Patient Vitals for the past 24 hrs:   Temp Pulse Resp BP SpO2   10/05/21 0909     95 %   10/05/21 0753 97.7 °F (36.5 °C) 67 20 126/77 95 %   10/05/21 0259 98.2 °F (36.8 °C) 78 20 126/85 98 %   10/05/21 0207     94 %   10/04/21 2229 98.5 °F (36.9 °C) 74 20 121/80 95 %   10/04/21 2038     96 %   10/04/21 2033     (!) 82 %   10/04/21 1947 98 °F (36.7 °C) 90 20 138/85 92 %   10/04/21 1546 97.8 °F (36.6 °C) 80 18 121/74 94 %   10/04/21 1153 98.3 °F (36.8 °C) 86 20 111/74 93 %     Oxygen Therapy  O2 Sat (%): 95 % (10/05/21 0909)  Pulse via Oximetry: 78 beats per minute (10/05/21 0909)  O2 Device: Hi flow nasal cannula;Humidifier;Heated (10/05/21 0909)  Skin Assessment: Clean, dry, & intact (10/05/21 0211)  O2 Flow Rate (L/min): 5 l/min (10/05/21 8409)    Estimated body mass index is 32.19 kg/m² as calculated from the following:    Height as of this encounter: 6' 3\" (1.905 m). Weight as of this encounter: 116.8 kg (257 lb 8 oz). Intake/Output Summary (Last 24 hours) at 10/5/2021 1058  Last data filed at 10/4/2021 1839  Gross per 24 hour   Intake 720 ml   Output    Net 720 ml         Physical Exam:    General:    Well nourished. No overt distress  Head:  Normocephalic, atraumatic  Eyes:  Sclerae appear normal.  Pupils equally round. HENT:  Nares appear normal, no drainage. Moist mucous membranes  Neck:  No restricted ROM. Trachea midline  CV:   RRR. No m/r/g. No JVD  Lungs:   CTAB. No wheezing, rhonchi, or rales. Even, unlabored  Abdomen:   Soft, nontender, nondistended. Extremities: Warm and dry. No cyanosis or clubbing. No edema. Skin:     No rashes. Normal coloration  Neuro:  Cranial nerves II-XII grossly intact. Psych:  Normal mood and affect. Signed:  PIPPA Flores    Part of this note may have been written by using a voice dictation software. The note has been proof read but may still contain some grammatical/other typographical errors.

## 2021-10-05 NOTE — PROGRESS NOTES
Oxygen Qualifier       Room air: SpO2 with O2 and liter flow   Resting SpO2  87%  87% on 1L  90% on 2L   Ambulating SpO2   83% on 2L  85% on 3L  88% on 4L     Patient able to ambulate around room with a good, steady pace, conversational throughout, with oxygen saturation staying at or above 89% throughout except for a very brief dip to 88% while ambulating. Patient left in chair on 5L O2 via NC as he was found. RN informed. Patient reports he has pulse oximeter at home to monitor himself.      Completed by:    Rodolfo Casey PTA

## 2021-10-07 ENCOUNTER — HOME CARE VISIT (OUTPATIENT)
Dept: SCHEDULING | Facility: HOME HEALTH | Age: 41
End: 2021-10-07
Payer: COMMERCIAL

## 2021-10-07 PROCEDURE — G0299 HHS/HOSPICE OF RN EA 15 MIN: HCPCS

## 2021-10-07 PROCEDURE — 400013 HH SOC

## 2021-10-11 VITALS
SYSTOLIC BLOOD PRESSURE: 144 MMHG | HEART RATE: 74 BPM | RESPIRATION RATE: 17 BRPM | OXYGEN SATURATION: 96 % | TEMPERATURE: 97.9 F | DIASTOLIC BLOOD PRESSURE: 76 MMHG

## 2021-10-13 ENCOUNTER — HOME CARE VISIT (OUTPATIENT)
Dept: SCHEDULING | Facility: HOME HEALTH | Age: 41
End: 2021-10-13
Payer: COMMERCIAL

## 2022-03-18 PROBLEM — U07.1 COVID-19: Status: ACTIVE | Noted: 2021-09-28

## 2022-03-18 PROBLEM — I82.403 ACUTE DEEP VEIN THROMBOSIS (DVT) OF BOTH LOWER EXTREMITIES (HCC): Status: ACTIVE | Noted: 2021-10-01

## 2022-03-19 PROBLEM — I10 ESSENTIAL HYPERTENSION: Status: ACTIVE | Noted: 2017-11-27

## 2022-03-20 PROBLEM — G47.33 OSA (OBSTRUCTIVE SLEEP APNEA): Status: ACTIVE | Noted: 2021-09-28

## 2022-03-20 PROBLEM — J96.00 ACUTE RESPIRATORY FAILURE (HCC): Status: ACTIVE | Noted: 2021-09-28

## 2024-05-23 NOTE — PROGRESS NOTES
Needs: Not on file   Physical Activity: Not on file   Stress: Not on file   Social Connections: Not on file   Intimate Partner Violence: Not on file   Housing Stability: Not on file         Family History   Problem Relation Age of Onset    Heart Disease Father         stent placement    Hypertension Father      He has a family history significant for no first-degree relatives with snoring other than mother on CPAP, he does have a son who struggles with insomnia/ADHD    Allergies   Allergen Reactions    Shellfish Allergy Nausea And Vomiting         Current Outpatient Medications   Medication Sig    amLODIPine-valsartan (EXFORGE) 5-160 MG per tablet Take 1 tablet by mouth daily    escitalopram (LEXAPRO) 10 MG tablet Take 1 tablet by mouth daily    fluticasone (FLONASE) 50 MCG/ACT nasal spray 1 spray by Each Nostril route daily     No current facility-administered medications for this visit.       REVIEW OF SYSTEMS    Full sleep history questionnaire was reviewed with the patient and a 14 ROS obtained. Significant positive findings are indicated in the HPI. All other systems were reviewed and are negative.Significant negatives may be indicated in the HPI if pertinent to the present illness.     PHYSICAL EXAM:    Vitals:    05/29/24 1542   BP: 134/84   Pulse: 72   Resp: 16   Temp: 97.7 °F (36.5 °C)   SpO2: 96%       Constitutional: Pleasant 43 y.o. male, appears well-developed and well-nourished. Not in acute distress.     Eyes: Conjunctivae and lids appear normal. No pallor or icterus   Skin: Not diaphoretic. No lesions  HENT: Head normocephalic. Nares with boggy mucosa and very crowded, slightly enlarged turbinates, no polyps identified oropharynx clear without erythema or exudate. Moist mucous membranes. Mallampati is 3, minimal tonsillar tissue. Tongue is enlarged with mild scalloping. Patient has adequate dentition, no molar flattening mildly high arched palate.  No retrognathia, goatee beard noted  Cardiovascular:

## 2024-05-29 ENCOUNTER — OFFICE VISIT (OUTPATIENT)
Dept: SLEEP MEDICINE | Age: 44
End: 2024-05-29

## 2024-05-29 VITALS
SYSTOLIC BLOOD PRESSURE: 134 MMHG | OXYGEN SATURATION: 96 % | TEMPERATURE: 97.7 F | HEIGHT: 75 IN | RESPIRATION RATE: 16 BRPM | WEIGHT: 289.2 LBS | HEART RATE: 72 BPM | BODY MASS INDEX: 35.96 KG/M2 | DIASTOLIC BLOOD PRESSURE: 84 MMHG

## 2024-05-29 DIAGNOSIS — G47.33 OSA (OBSTRUCTIVE SLEEP APNEA): Primary | ICD-10-CM

## 2024-05-29 DIAGNOSIS — E66.9 OBESITY, CLASS I, BMI 30-34.9: ICD-10-CM

## 2024-05-29 DIAGNOSIS — I10 ESSENTIAL HYPERTENSION: ICD-10-CM

## 2024-05-29 DIAGNOSIS — J31.0 CHRONIC RHINITIS: ICD-10-CM

## 2024-05-29 PROBLEM — E66.811 OBESITY, CLASS I, BMI 30-34.9: Status: ACTIVE | Noted: 2024-05-29

## 2024-05-29 PROCEDURE — 3075F SYST BP GE 130 - 139MM HG: CPT | Performed by: INTERNAL MEDICINE

## 2024-05-29 PROCEDURE — 3079F DIAST BP 80-89 MM HG: CPT | Performed by: INTERNAL MEDICINE

## 2024-05-29 PROCEDURE — 99204 OFFICE O/P NEW MOD 45 MIN: CPT | Performed by: INTERNAL MEDICINE

## 2024-05-29 RX ORDER — AMLODIPINE AND VALSARTAN 5; 160 MG/1; MG/1
1 TABLET ORAL DAILY
COMMUNITY
Start: 2024-04-21

## 2024-05-29 RX ORDER — FLUTICASONE PROPIONATE 50 MCG
1 SPRAY, SUSPENSION (ML) NASAL DAILY
Qty: 16 G | Refills: 2 | Status: SHIPPED | OUTPATIENT
Start: 2024-05-29

## 2024-05-29 RX ORDER — ESCITALOPRAM OXALATE 10 MG/1
10 TABLET ORAL DAILY
COMMUNITY
Start: 2024-05-23

## 2024-05-29 ASSESSMENT — SLEEP AND FATIGUE QUESTIONNAIRES
ESS TOTAL SCORE: 8
NECK CIRCUMFERENCE (INCHES): 18
HOW LIKELY ARE YOU TO NOD OFF OR FALL ASLEEP WHILE LYING DOWN TO REST IN THE AFTERNOON WHEN CIRCUMSTANCES PERMIT: SLIGHT CHANCE OF DOZING
HOW LIKELY ARE YOU TO NOD OFF OR FALL ASLEEP WHILE SITTING AND TALKING TO SOMEONE: WOULD NEVER DOZE
HOW LIKELY ARE YOU TO NOD OFF OR FALL ASLEEP WHILE SITTING INACTIVE IN A PUBLIC PLACE: HIGH CHANCE OF DOZING
HOW LIKELY ARE YOU TO NOD OFF OR FALL ASLEEP IN A CAR, WHILE STOPPED FOR A FEW MINUTES IN TRAFFIC: WOULD NEVER DOZE
HOW LIKELY ARE YOU TO NOD OFF OR FALL ASLEEP WHILE SITTING AND READING: SLIGHT CHANCE OF DOZING
HOW LIKELY ARE YOU TO NOD OFF OR FALL ASLEEP WHILE SITTING QUIETLY AFTER LUNCH WITHOUT ALCOHOL: SLIGHT CHANCE OF DOZING
HOW LIKELY ARE YOU TO NOD OFF OR FALL ASLEEP WHEN YOU ARE A PASSENGER IN A CAR FOR AN HOUR WITHOUT A BREAK: SLIGHT CHANCE OF DOZING
HOW LIKELY ARE YOU TO NOD OFF OR FALL ASLEEP WHILE WATCHING TV: SLIGHT CHANCE OF DOZING

## 2024-05-29 NOTE — ASSESSMENT & PLAN NOTE
Patient's blood pressure is  in control today.  Relationship with hypertension and untreated sleep apnea discussed.  Blood pressure may improve with successful treatment of obstructive sleep apnea.  We also discussed palpitations in association with arrhythmia such as atrial fibrillation, should he have recurrent palpitations encouraged him to get evaluated with a Holter monitor.  We also did discuss trying to cut back on his alcohol use to only 3 or 4 beers a day max.

## 2024-05-29 NOTE — PATIENT INSTRUCTIONS
relax during sleep.  You can help treat sleep apnea at home by making lifestyle changes. You also can use a CPAP breathing machine that keeps tissues in the throat from blocking your airway. Or your doctor may suggest that you use a breathing device while you sleep. It helps keep your airway open. This could be a device that you put in your mouth. In some cases, surgery may be needed to remove enlarged tissues in the throat.  Follow-up care is a key part of your treatment and safety. Be sure to make and go to all appointments, and call your doctor if you are having problems. It's also a good idea to know your test results and keep a list of the medicines you take.  How can you care for yourself at home?  Lose weight, if needed.  Sleep on your side. It may help mild apnea.  Avoid alcohol and medicines such as sleeping pills, opioids, or sedatives before bed.  Don't smoke. If you need help quitting, talk to your doctor.  Prop up the head of your bed.  Treat breathing problems, such as a stuffy nose, that are caused by a cold or allergies.  Try a continuous positive airway pressure (CPAP) breathing machine if your doctor recommends it.  If CPAP doesn't work for you, ask your doctor if you can try other masks, settings, or breathing machines.  Try oral breathing devices or other nasal devices.  Talk to your doctor if your nose feels dry or bleeds, or if it gets runny or stuffy when you use a breathing machine.  Tell your doctor if you're sleepy during the day and it affects your daily life. Don't drive or operate machinery when you're drowsy.  When should you call for help?  Watch closely for changes in your health, and be sure to contact your doctor if:    You still have sleep apnea even though you have made lifestyle changes.     You are thinking of trying a device such as CPAP.     You are having problems using a CPAP or similar machine.     You are still sleepy during the day, and it affects your daily life.   Where

## 2024-05-29 NOTE — ASSESSMENT & PLAN NOTE
Patient does have very severe obstructive sleep apnea from study in 2019, I do not have his repeat home test which we will request but will check overnight oxygen on current settings.  I did narrow his pressure range to 6-12 cm H2O as he is not hitting above 10 cm, for his nasal congestion we will add Flonase.  If this is not helpful after a month he is to give me a call.  Supply order sent to [facility], he is using and benefiting from therapy.  We will see him back annually, sooner if needed.  -He is only sleeping about 5-1/2 hours a night and encouraged him to try to get an extra hour roughly of sleep on PAP therapy.

## 2024-06-06 ENCOUNTER — TRANSCRIBE ORDERS (OUTPATIENT)
Dept: SCHEDULING | Age: 44
End: 2024-06-06

## 2024-06-06 DIAGNOSIS — S49.92XA INJURY OF LEFT SHOULDER, INITIAL ENCOUNTER: Primary | ICD-10-CM

## 2025-06-02 ENCOUNTER — TELEMEDICINE (OUTPATIENT)
Dept: SLEEP MEDICINE | Age: 45
End: 2025-06-02

## 2025-06-02 DIAGNOSIS — G47.33 OSA (OBSTRUCTIVE SLEEP APNEA): Primary | ICD-10-CM

## 2025-06-02 DIAGNOSIS — E66.9 OBESITY (BMI 35.0-39.9 WITHOUT COMORBIDITY): ICD-10-CM

## 2025-06-02 DIAGNOSIS — G47.34 NOCTURNAL HYPOXEMIA: ICD-10-CM

## 2025-06-02 PROCEDURE — 99213 OFFICE O/P EST LOW 20 MIN: CPT | Performed by: NURSE PRACTITIONER

## 2025-06-02 ASSESSMENT — SLEEP AND FATIGUE QUESTIONNAIRES
HOW LIKELY ARE YOU TO NOD OFF OR FALL ASLEEP WHILE LYING DOWN TO REST IN THE AFTERNOON WHEN CIRCUMSTANCES PERMIT: SLIGHT CHANCE OF DOZING
HOW LIKELY ARE YOU TO NOD OFF OR FALL ASLEEP WHILE WATCHING TV: WOULD NEVER DOZE
HOW LIKELY ARE YOU TO NOD OFF OR FALL ASLEEP WHILE SITTING INACTIVE IN A PUBLIC PLACE: WOULD NEVER DOZE
ESS TOTAL SCORE: 1
HOW LIKELY ARE YOU TO NOD OFF OR FALL ASLEEP WHILE SITTING AND TALKING TO SOMEONE: WOULD NEVER DOZE
HOW LIKELY ARE YOU TO NOD OFF OR FALL ASLEEP IN A CAR, WHILE STOPPED FOR A FEW MINUTES IN TRAFFIC: WOULD NEVER DOZE
HOW LIKELY ARE YOU TO NOD OFF OR FALL ASLEEP WHILE SITTING QUIETLY AFTER LUNCH WITHOUT ALCOHOL: WOULD NEVER DOZE
HOW LIKELY ARE YOU TO NOD OFF OR FALL ASLEEP WHEN YOU ARE A PASSENGER IN A CAR FOR AN HOUR WITHOUT A BREAK: WOULD NEVER DOZE
HOW LIKELY ARE YOU TO NOD OFF OR FALL ASLEEP WHILE SITTING AND READING: WOULD NEVER DOZE

## 2025-06-02 NOTE — PROGRESS NOTES
male  Address: [address] [place] [id number]   Patient phone: [phone] (home)       Primary Insurance: Payor: / No coverage found.  Subscriber ID: [id number] - (Worker's Comp)      AMB Supply Order  Order Details     DME Location: [facility]   Order Date: 6/2/2025   The primary encounter diagnosis was JER (obstructive sleep apnea). Diagnoses of Nocturnal hypoxemia and Obesity (BMI 35.0-39.9 without comorbidity) were also pertinent to this visit.             (  X   )Supplies Needed       CPAP Machine   (     ) CPAP Unit  (     ) Auto CPAP Unit  (     ) BiLevel Unit  (     ) Auto BiLevel Unit  (     ) ASV        (     ) Bilevel ST      Length of need: 12 months    Pressure:  6-12 cmH20  EPR:     Starting Ramp Pressure:   cm H20  Ramp Time: min        Patient had a diagnostic Apnea Hypopnea Index (AHI) of :  51.7  *SUPPLIES* Replace all as needed, or per coverage guidelines     Masks Type:  (    ) -Full Face Mask (1 per 3 mon)  (    ) -Full Mask (1 per month) Interface/Cushion      ( x ) -Nasal Mask (1 per 3 mon)  ( x  ) - Nasal Mask (1 per month) Interface/Cushion  (  x   ) -Pillow (2 per mon)  (     ) -Meflvsaqc (1 per 6 mon)            Other Supplies:    (   X  )-Tucnttnq (1 per 6 mon)  (   X  )-Gfiamk Tubing (1 per 3 mon)  (   X  )- Disposable Filter (2 per mon)  (   x  )-Hmfvev Humidifier (1 per year)     (  x   )-Vysmvsdck (sometimes used with Full Face Mask) (1 per 6 mos)  (    )-Tubing-without heat (1 per 3 mos)  (     )-Non-Disposable Filter (1 per 6 mos)  (  x   )-Water Chamber (1 per 6 mos)  (     )-Humidifier non-heated (1 per 5 yrs)      Signed Date: 6/2/2025  Electronically Signed By: ROBBI Torrez - CNP  Electronically Dated:  6/2/2025             Collaborating Physician: Dr. Duron       I spent at least 20 minutes with this established patient, and >50% of the time was spent

## 2025-06-02 NOTE — PATIENT INSTRUCTIONS
Continue CPAP 6-12 cm H2O with nightly compliance  New CPAP set up and supplies ordered  Recommendations as above  Follow-up in 1 year or sooner if needed